# Patient Record
Sex: FEMALE | Race: WHITE | NOT HISPANIC OR LATINO | URBAN - METROPOLITAN AREA
[De-identification: names, ages, dates, MRNs, and addresses within clinical notes are randomized per-mention and may not be internally consistent; named-entity substitution may affect disease eponyms.]

---

## 2021-04-14 DIAGNOSIS — Z23 ENCOUNTER FOR IMMUNIZATION: ICD-10-CM

## 2022-01-20 ENCOUNTER — APPOINTMENT (OUTPATIENT)
Dept: LAB | Facility: HOSPITAL | Age: 77
End: 2022-01-20
Attending: ORTHOPAEDIC SURGERY
Payer: MEDICARE

## 2022-01-20 ENCOUNTER — TRANSCRIBE ORDERS (OUTPATIENT)
Dept: LAB | Facility: HOSPITAL | Age: 77
End: 2022-01-20

## 2022-01-20 ENCOUNTER — HOSPITAL ENCOUNTER (OUTPATIENT)
Dept: CARDIOLOGY | Facility: HOSPITAL | Age: 77
Discharge: HOME | End: 2022-01-20
Attending: ORTHOPAEDIC SURGERY
Payer: MEDICARE

## 2022-01-20 ENCOUNTER — OFFICE VISIT (OUTPATIENT)
Dept: PREADMISSION TESTING | Facility: HOSPITAL | Age: 77
End: 2022-01-20
Attending: ORTHOPAEDIC SURGERY
Payer: MEDICARE

## 2022-01-20 VITALS
OXYGEN SATURATION: 98 % | HEART RATE: 83 BPM | BODY MASS INDEX: 20.51 KG/M2 | RESPIRATION RATE: 18 BRPM | DIASTOLIC BLOOD PRESSURE: 58 MMHG | TEMPERATURE: 98.8 F | SYSTOLIC BLOOD PRESSURE: 140 MMHG | WEIGHT: 130.7 LBS | HEIGHT: 67 IN

## 2022-01-20 DIAGNOSIS — E11.9 TYPE 2 DIABETES MELLITUS WITHOUT COMPLICATION, WITHOUT LONG-TERM CURRENT USE OF INSULIN (CMS/HCC): ICD-10-CM

## 2022-01-20 DIAGNOSIS — Z20.822 CONTACT WITH AND (SUSPECTED) EXPOSURE TO COVID-19: ICD-10-CM

## 2022-01-20 DIAGNOSIS — M17.11 PRIMARY OSTEOARTHRITIS OF RIGHT KNEE: Primary | ICD-10-CM

## 2022-01-20 DIAGNOSIS — E78.5 HYPERLIPIDEMIA, UNSPECIFIED HYPERLIPIDEMIA TYPE: ICD-10-CM

## 2022-01-20 DIAGNOSIS — M17.11 UNILATERAL PRIMARY OSTEOARTHRITIS, RIGHT KNEE: ICD-10-CM

## 2022-01-20 DIAGNOSIS — Z20.822 CONTACT WITH AND (SUSPECTED) EXPOSURE TO COVID-19: Primary | ICD-10-CM

## 2022-01-20 DIAGNOSIS — I10 PRIMARY HYPERTENSION: ICD-10-CM

## 2022-01-20 DIAGNOSIS — Z01.818 PREOP EXAMINATION: ICD-10-CM

## 2022-01-20 LAB
ABO + RH BLD: NORMAL
ANION GAP SERPL CALC-SCNC: 11 MEQ/L (ref 3–15)
APTT PPP: 27 SEC (ref 23–35)
ATRIAL RATE: 82
BLD GP AB SCN SERPL QL: NEGATIVE
BUN SERPL-MCNC: 29 MG/DL (ref 8–20)
CALCIUM SERPL-MCNC: 9.9 MG/DL (ref 8.9–10.3)
CHLORIDE SERPL-SCNC: 104 MEQ/L (ref 98–109)
CO2 SERPL-SCNC: 26 MEQ/L (ref 22–32)
CREAT SERPL-MCNC: 0.7 MG/DL (ref 0.6–1.1)
D AG BLD QL: POSITIVE
ERYTHROCYTE [DISTWIDTH] IN BLOOD BY AUTOMATED COUNT: 12.6 % (ref 11.7–14.4)
GFR SERPL CREATININE-BSD FRML MDRD: >60 ML/MIN/1.73M*2
GLUCOSE SERPL-MCNC: 113 MG/DL (ref 70–99)
HCT VFR BLDCO AUTO: 46.6 % (ref 35–45)
HGB BLD-MCNC: 15.8 G/DL (ref 11.8–15.7)
INR PPP: 1
LABORATORY COMMENT REPORT: NORMAL
MCH RBC QN AUTO: 31.2 PG (ref 28–33.2)
MCHC RBC AUTO-ENTMCNC: 33.9 G/DL (ref 32.2–35.5)
MCV RBC AUTO: 92.1 FL (ref 83–98)
P AXIS: 64
PDW BLD AUTO: 9.2 FL (ref 9.4–12.3)
PLATELET # BLD AUTO: 190 K/UL (ref 150–369)
POTASSIUM SERPL-SCNC: 3.9 MEQ/L (ref 3.6–5.1)
PR INTERVAL: 190
PROTHROMBIN TIME: 12.8 SEC (ref 12.2–14.5)
QRS DURATION: 96
QT INTERVAL: 400
QTC CALCULATION(BAZETT): 467
R AXIS: -9
RBC # BLD AUTO: 5.06 M/UL (ref 3.93–5.22)
SODIUM SERPL-SCNC: 141 MEQ/L (ref 136–144)
SPECIMEN EXP DATE BLD: NORMAL
T WAVE AXIS: 65
VENTRICULAR RATE: 82
WBC # BLD AUTO: 4.68 K/UL (ref 3.8–10.5)

## 2022-01-20 PROCEDURE — 85730 THROMBOPLASTIN TIME PARTIAL: CPT | Mod: GA

## 2022-01-20 PROCEDURE — 85027 COMPLETE CBC AUTOMATED: CPT

## 2022-01-20 PROCEDURE — 86901 BLOOD TYPING SEROLOGIC RH(D): CPT

## 2022-01-20 PROCEDURE — 85610 PROTHROMBIN TIME: CPT | Mod: GA

## 2022-01-20 PROCEDURE — G8753 SYS BP > OR = 140: HCPCS | Performed by: HOSPITALIST

## 2022-01-20 PROCEDURE — 99214 OFFICE O/P EST MOD 30 MIN: CPT | Performed by: HOSPITALIST

## 2022-01-20 PROCEDURE — G8754 DIAS BP LESS 90: HCPCS | Performed by: HOSPITALIST

## 2022-01-20 PROCEDURE — 93005 ELECTROCARDIOGRAM TRACING: CPT

## 2022-01-20 PROCEDURE — 36415 COLL VENOUS BLD VENIPUNCTURE: CPT

## 2022-01-20 PROCEDURE — 80048 BASIC METABOLIC PNL TOTAL CA: CPT

## 2022-01-20 RX ORDER — GABAPENTIN 400 MG/1
400 CAPSULE ORAL 4 TIMES DAILY
COMMUNITY
Start: 2021-11-08

## 2022-01-20 RX ORDER — MELOXICAM 7.5 MG/1
7.5 TABLET ORAL NIGHTLY
COMMUNITY
Start: 2022-01-01 | End: 2022-02-12 | Stop reason: HOSPADM

## 2022-01-20 RX ORDER — HYDROCHLOROTHIAZIDE 25 MG/1
25 TABLET ORAL EVERY MORNING
COMMUNITY
Start: 2021-12-19

## 2022-01-20 RX ORDER — PRAVASTATIN SODIUM 40 MG/1
40 TABLET ORAL NIGHTLY
COMMUNITY
Start: 2021-11-29

## 2022-01-20 RX ORDER — LOSARTAN POTASSIUM 100 MG/1
100 TABLET ORAL EVERY MORNING
COMMUNITY
Start: 2022-01-15

## 2022-01-20 RX ORDER — METFORMIN HYDROCHLORIDE 500 MG/1
500 TABLET ORAL NIGHTLY
COMMUNITY

## 2022-01-20 ASSESSMENT — PAIN SCALES - GENERAL: PAINLEVEL: 2

## 2022-01-20 NOTE — PRE-PROCEDURE INSTRUCTIONS
1. You will be called between 3pm-7pm one business day before your procedure to tell you where and when to report. If you do not receive a phone call by 7pm, please call the Admissions office at [phone].    2. Please report to Admissions or Short Procedure Unit on the day of your procedure.   Detailed directions will be given to you when you are called with arrival time.        3. Please follow the following fasting guidelines:     No solid food EIGHT HOURS prior to surgery.  Unlimited clear liquids, meaning water or PLAIN black coffee WITHOUT any milk, cream, sugar, or sweetener are permitted up to TWO HOURS prior to arrival at the hospital.    4. Early on the morning of the procedure please take your usual dose of the listed medications with a sip of water:  No medications on the morning of surgery as per Dr Alba    5. Other Instructions: You may brush your teeth the morning of the procedure. Rinse and spit, do not swallow.  Bring a list of your medications with dosages.  Use surgical wash as directed.     6. If you develop a cold, cough, fever, rash, or other symptom prior to the day of the procedure, please report it to your physician immediately.    7. If you need to cancel the procedure for any reason, please contact your physician.    8. Make arrangements to have someone drive you home from the procedure. If you have not arranged for transportation home, your surgery may be cancelled.     9. You may not take public transportation unless accompanied by a responsible person.    10. You may not drive a car or operate complex or potentially dangerous machinery for 24 hours following anesthesia and/or sedation.    11.  If it is medically necessary for you to have a longer stay, you will be informed as soon as the decision is made.    12. Only bring essential items to the hospital.  Do not wear or bring anything of value to the hospital including jewelry of any kind, money, or wallet. Do not wear make-up or  contact lenses.  DO NOT BRING MEDICATIONS FROM HOME unless instructed to do so. DO bring your hearing aids, glasses, and a case    13. No lotion, creams, powders, or oils on skin the morning of procedure     14. Dress in comfortable clothes.    15.  If instructed, please bring a copy of your Advanced Directive (Living Will/Durable Power of ) on the day of your procedure.     16. Patients need to quarantine from the time of PAT COVID test to day of surgery, regardless of COVID vaccine status.      17. Ensuring your safety at all times is a very important part of our [facility] Culture of Safety. After having surgery and sedation, you are at risk for falling and balance issues. Although you may feel awake, the effects of the medication can last up to 24 hours after anesthesia. If you need to use the bathroom during your recovery period, nursing staff will escort you there and stay with you to ensure your safety.    18. Refrain from drinking alcohol and smoking cigarettes for 24 hours prior to surgery.    19. Shower with antibacterial soap (Dial) the night before and morning of your procedure.  If your procedure indicates the need for CHG antiseptic wash (Bactoshield or Hibiclens), please use this instead and follow instructions as discussed at the time of your Pre-Admission Testing phone interview or visit.    Above instructions reviewed with patient and patient acknowledges understanding.    Form explained by: Babs Grimes LPN

## 2022-01-20 NOTE — H&P (VIEW-ONLY)
"   Hospital Medicine Service -  Pre-Operative Consultation       Patient Name: Cecily Cuenca  Referring Surgeon: Dr. Leos    Reason for Referral: Pre-Operative Evaluation  Surgical Procedure: Right knee replacement  Operative Date: 2/10/2022  Other Providers:      PCP: Unable, To Obtain Pcp          HISTORY OF PRESENT ILLNESS      Cecily Cuenca is a 76 y.o. female presenting today to the [facility] at [facility] for pre-operative evaluation prior to planned surgery.    Patient describes a right knee pain associated with limping for \"many years\".  She has the discomfort every day, rated anywhere from a 2-10 out of 10 in severity.  Her symptoms are getting worse.  Patient has tried outpatient conservative measures include physical therapy and injections, unfortunately she remains symptomatic.  Patient to follow-up with orthopedic surgery now for planned right knee replacement on 2/10/2022    In regards to medical history:  Hypertension-controlled  Diabetes-controlled    The patient denies any current or recent chest pain or pressure, dyspnea, cough, sputum, fevers, chills, abdominal pain, nausea, vomiting, diarrhea or other symptoms.     Functionally, the patient is able to ascend a flight or so of stairs with no dyspnea or chest pain.     The patient denies, on specific questioning, the following:  No history of MI, arrhythmia,or CHF.  No history of VINNIE.  No history of DVT/PE.  No history of COPD.  No history of CVA.    No history of CKD.     PAST MEDICAL AND SURGICAL HISTORY      Past Medical History:   Diagnosis Date   • Arthritis     shoulders, knees, feet, low back, neck   • COVID-19 vaccine series completed     Pfizer x2 plus booster   • Depression     History of   • Diverticulitis of colon     past hx   • History of MRSA infection 2018    buttocks   • History of transfusion     POST OP TONSILLECTOMY AGE 8   • Hyperthyroidism    • Lipid disorder    • " "Sciatica without back pain, left     to the foot   • Skin cancer     BASAL CELL REMOVED FROM NOSE       Past Surgical History:   Procedure Laterality Date   • CHOLECYSTECTOMY     • COLONOSCOPY     • EYE SURGERY Bilateral    • HYSTERECTOMY     • TONSILLECTOMY         MEDICATIONS        Current Outpatient Medications:   •  gabapentin 400 mg capsule, Take 400 mg by mouth 4 (four) times a day.  , Disp: , Rfl:   •  hydrochlorothiazide 25 mg tablet, Take 25 mg by mouth every morning.  , Disp: , Rfl:   •  losartan 100 mg tablet, Take 100 mg by mouth every morning.  , Disp: , Rfl:   •  meloxicam 7.5 mg tablet, Take 7.5 mg by mouth nightly.  , Disp: , Rfl:   •  metFORMIN 500 mg tablet, Take 500 mg by mouth nightly.  , Disp: , Rfl:   •  pravastatin 40 mg tablet, Take 40 mg by mouth nightly.  , Disp: , Rfl:     ALLERGIES      Patient has no known allergies.    FAMILY HISTORY      family history is not on file.    Denies any prior known family history of DVTs/PEs/clotting disorder    SOCIAL HISTORY      Social History     Tobacco Use   • Smoking status: Former Smoker     Packs/day: 0.50     Years: 20.00     Pack years: 10.00     Types: Cigarettes     Quit date: 2005     Years since quittin.0   • Smokeless tobacco: Never Used   Substance Use Topics   • Alcohol use: Never   • Drug use: Never       REVIEW OF SYSTEMS      All other systems reviewed and negative except as noted in HPI    PHYSICAL EXAMINATION      Visit Vitals  BP (!) 140/58   Pulse 83   Temp 37.1 °C (98.8 °F) (Oral)   Resp 18   Ht 1.702 m (5' 7\")   Wt 59.3 kg (130 lb 11.2 oz)   SpO2 98%   BMI 20.47 kg/m²     Body mass index is 20.47 kg/m².    Physical Exam  General:    Alert, cooperative, no acute distress   Head:    Normocephalic, atraumatic   Eyes:   EOMI BL            Lungs:     Clear to auscultation bilaterally, respirations unlabored   Heart:    RRR, S1 and S2 normal, no m/r/g   Abdomen:     Soft, non-tender   Extremities:  Musculoskeletal:  Right " knee crepitus on flexion extension  Moves all limbs               Neurologic:  Behavior/Emotional:   AAOx3. Grossly nonfocal    Appropriate, cooperative       LABS / EKG        Labs  Lab Results   Component Value Date     01/20/2022    K 3.9 01/20/2022     01/20/2022    CO2 26 01/20/2022    BUN 29 (H) 01/20/2022    CREATININE 0.7 01/20/2022    WBC 4.68 01/20/2022    HGB 15.8 (H) 01/20/2022    HCT 46.6 (H) 01/20/2022     01/20/2022    INR 1.0 01/20/2022       ECG/Telemetry  I have independently reviewed the ECG. No significant findings.    ASSESSMENT AND PLAN         Osteoarthritis of right knee  Patient describes right knee limping and pain for many years, she has failed outpatient conservative measures including PT and injections  Patient to follow-up with OP surgery now for a planned right knee replacement on 2/10/2022  Additional perioperative recommendations outlined below.    Preop examination  Patient for planned right knee replacement on 2/10/2022  RCRI 0.4% chance of major cardiac event- this acceptable risk for the proposed procedure  Patient will hold her home losartan on a.m. of surgery  Additional recommendations outlined below    Primary hypertension  BP controlled  Patient will hold her home losartan on a.m. of surgery, can add back as clinically indicated postop  Noted patient takes her hydrochlorothiazide in the evenings, okay to take the night prior to surgery, can add back as clinically indicated postop    Monitor BP postop    Diabetes (CMS/[facility])  Controlled, with a recent reported hemoglobin A1c of 5.9  Patient usually takes metformin in the evenings, okay to take the night prior to surgery  Recommend point-of-care Accu-Cheks and sliding scale insulin postop  Can resume metformin on discharge with continued outpatient follow-up with her PCP.    Hyperlipidemia  Continue home meds postop       In regards to perioperative cardiac risk:  The patient denies any history of ischemic  heart disease, denies any history of CHF, denies any history of CVA, is not on pre-operative treatment with insulin, and does not have a pre-operative creatinine > 2 mg/dL.   The Revised Cardiac Risk Index (RCRI) for this patient indicates 0.4% risk.  This is acceptable risk for the proposed procedure    Further comments:  Resume supplements when OK with surgical team.  I would encourage incentive spirometry to assist with minimizing florence-operative pulmonary risk.  DVT prophylaxis and timing of such per the discretion of the surgeon.     Please do not hesitate to contact [facility] during the upcoming hospitalization with any questions or concerns.    Additional Recommendations:     Delirium  Given her age, I would attempt to minimize any narcotics. She theoretically is at risk for post-operative delirium. Should this develop, I would encourage conservative measures, supportive care, re-orientation tactics, and attempt to avoid pharmacologic utilization. Additionally, please mobilize when safe from a surgical standpoint. Prevent dehydration and avoid constipation. Maintain oxygenation. Address sensory deprivation. Avoid jose catheters and restraints as possible and re-orient as appropriate. Maintain adequate pain control. Avoid unnecessary sedating drugs. Minimize sleep disruptions and maintain sleep wake cycle.          Loc Alba MD  1/20/2022

## 2022-01-20 NOTE — ASSESSMENT & PLAN NOTE
Controlled, with a recent reported hemoglobin A1c of 5.9  Patient usually takes metformin in the evenings, okay to take the night prior to surgery  Recommend point-of-care Accu-Cheks and sliding scale insulin postop  Can resume metformin on discharge with continued outpatient follow-up with her PCP.

## 2022-01-20 NOTE — ASSESSMENT & PLAN NOTE
Patient describes right knee limping and pain for many years, she has failed outpatient conservative measures including PT and injections  Patient to follow-up with OP surgery now for a planned right knee replacement on 2/10/2022  Additional perioperative recommendations outlined below.

## 2022-01-20 NOTE — ASSESSMENT & PLAN NOTE
Patient for planned right knee replacement on 2/10/2022  RCRI 0.4% chance of major cardiac event- this acceptable risk for the proposed procedure  Patient will hold her home losartan on a.m. of surgery  Additional recommendations outlined below

## 2022-01-20 NOTE — ASSESSMENT & PLAN NOTE
BP controlled  Patient will hold her home losartan on a.m. of surgery, can add back as clinically indicated postop  Noted patient takes her hydrochlorothiazide in the evenings, okay to take the night prior to surgery, can add back as clinically indicated postop    Monitor BP postop

## 2022-02-07 ENCOUNTER — APPOINTMENT (OUTPATIENT)
Dept: LAB | Facility: HOSPITAL | Age: 77
End: 2022-02-07
Attending: ORTHOPAEDIC SURGERY
Payer: MEDICARE

## 2022-02-07 DIAGNOSIS — M17.11 UNILATERAL PRIMARY OSTEOARTHRITIS, RIGHT KNEE: ICD-10-CM

## 2022-02-07 DIAGNOSIS — Z20.822 CONTACT WITH AND (SUSPECTED) EXPOSURE TO COVID-19: ICD-10-CM

## 2022-02-07 LAB — SARS-COV-2 RNA RESP QL NAA+PROBE: NEGATIVE

## 2022-02-07 PROCEDURE — C9803 HOPD COVID-19 SPEC COLLECT: HCPCS

## 2022-02-07 PROCEDURE — U0003 INFECTIOUS AGENT DETECTION BY NUCLEIC ACID (DNA OR RNA); SEVERE ACUTE RESPIRATORY SYNDROME CORONAVIRUS 2 (SARS-COV-2) (CORONAVIRUS DISEASE [COVID-19]), AMPLIFIED PROBE TECHNIQUE, MAKING USE OF HIGH THROUGHPUT TECHNOLOGIES AS DESCRIBED BY CMS-2020-01-R: HCPCS

## 2022-02-08 ENCOUNTER — ANESTHESIA EVENT (OUTPATIENT)
Dept: OPERATING ROOM | Facility: HOSPITAL | Age: 77
End: 2022-02-08
Payer: MEDICARE

## 2022-02-10 ENCOUNTER — HOSPITAL ENCOUNTER (OUTPATIENT)
Facility: HOSPITAL | Age: 77
Discharge: HOME | End: 2022-02-12
Attending: ORTHOPAEDIC SURGERY | Admitting: ORTHOPAEDIC SURGERY
Payer: MEDICARE

## 2022-02-10 ENCOUNTER — ANESTHESIA (OUTPATIENT)
Dept: OPERATING ROOM | Facility: HOSPITAL | Age: 77
End: 2022-02-10
Payer: MEDICARE

## 2022-02-10 ENCOUNTER — APPOINTMENT (OUTPATIENT)
Dept: RADIOLOGY | Facility: HOSPITAL | Age: 77
End: 2022-02-10
Attending: NURSE PRACTITIONER
Payer: MEDICARE

## 2022-02-10 DIAGNOSIS — M17.11 PRIMARY OSTEOARTHRITIS OF RIGHT KNEE: Primary | ICD-10-CM

## 2022-02-10 LAB
ABO + RH BLD: NORMAL
BLD GP AB SCN SERPL QL: NEGATIVE
D AG BLD QL: POSITIVE
GLUCOSE BLD-MCNC: 119 MG/DL (ref 70–99)
GLUCOSE BLD-MCNC: 122 MG/DL (ref 70–99)
GLUCOSE BLD-MCNC: 169 MG/DL (ref 70–99)
GLUCOSE BLD-MCNC: 205 MG/DL (ref 70–99)
GLUCOSE BLD-MCNC: 271 MG/DL (ref 70–99)
LABORATORY COMMENT REPORT: NORMAL
POCT TEST: ABNORMAL
SPECIMEN EXP DATE BLD: NORMAL

## 2022-02-10 PROCEDURE — 63600000 HC DRUGS/DETAIL CODE: Performed by: NURSE PRACTITIONER

## 2022-02-10 PROCEDURE — 27200000 HC STERILE SUPPLY: Performed by: ORTHOPAEDIC SURGERY

## 2022-02-10 PROCEDURE — 97162 PT EVAL MOD COMPLEX 30 MIN: CPT | Mod: GP

## 2022-02-10 PROCEDURE — 71000001 HC PACU PHASE 1 INITIAL 30MIN: Performed by: ORTHOPAEDIC SURGERY

## 2022-02-10 PROCEDURE — 25800000 HC PHARMACY IV SOLUTIONS: Performed by: NURSE PRACTITIONER

## 2022-02-10 PROCEDURE — 86901 BLOOD TYPING SEROLOGIC RH(D): CPT

## 2022-02-10 PROCEDURE — 25000000 HC PHARMACY GENERAL: Performed by: NURSE ANESTHETIST, CERTIFIED REGISTERED

## 2022-02-10 PROCEDURE — 0SRC0JA REPLACEMENT OF RIGHT KNEE JOINT WITH SYNTHETIC SUBSTITUTE, UNCEMENTED, OPEN APPROACH: ICD-10-PCS | Performed by: ORTHOPAEDIC SURGERY

## 2022-02-10 PROCEDURE — 73560 X-RAY EXAM OF KNEE 1 OR 2: CPT | Mod: RT

## 2022-02-10 PROCEDURE — 63700000 HC SELF-ADMINISTRABLE DRUG: Performed by: NURSE PRACTITIONER

## 2022-02-10 PROCEDURE — 63600000 HC DRUGS/DETAIL CODE: Performed by: NURSE ANESTHETIST, CERTIFIED REGISTERED

## 2022-02-10 PROCEDURE — 63700000 HC SELF-ADMINISTRABLE DRUG: Performed by: PHYSICIAN ASSISTANT

## 2022-02-10 PROCEDURE — 99226 PR SBSQ OBSERVATION CARE/DAY 35 MINUTES: CPT | Performed by: HOSPITALIST

## 2022-02-10 PROCEDURE — 36415 COLL VENOUS BLD VENIPUNCTURE: CPT | Performed by: ORTHOPAEDIC SURGERY

## 2022-02-10 PROCEDURE — 36000016 HC OR LEVEL 6 EA ADDL MIN: Performed by: ORTHOPAEDIC SURGERY

## 2022-02-10 PROCEDURE — 25000000 HC PHARMACY GENERAL: Performed by: STUDENT IN AN ORGANIZED HEALTH CARE EDUCATION/TRAINING PROGRAM

## 2022-02-10 PROCEDURE — 63600000 HC DRUGS/DETAIL CODE: Performed by: STUDENT IN AN ORGANIZED HEALTH CARE EDUCATION/TRAINING PROGRAM

## 2022-02-10 PROCEDURE — 36000006 HC OR LEVEL 6 INITIAL 30MIN: Performed by: ORTHOPAEDIC SURGERY

## 2022-02-10 PROCEDURE — 37000002 HC ANESTHESIA MAC: Performed by: ORTHOPAEDIC SURGERY

## 2022-02-10 PROCEDURE — C1776 JOINT DEVICE (IMPLANTABLE): HCPCS | Performed by: ORTHOPAEDIC SURGERY

## 2022-02-10 PROCEDURE — 25000000 HC PHARMACY GENERAL: Performed by: NURSE PRACTITIONER

## 2022-02-10 PROCEDURE — 71000011 HC PACU PHASE 1 EA ADDL MIN: Performed by: ORTHOPAEDIC SURGERY

## 2022-02-10 PROCEDURE — 63600000 HC DRUGS/DETAIL CODE: Mod: JW | Performed by: NURSE ANESTHETIST, CERTIFIED REGISTERED

## 2022-02-10 PROCEDURE — 63600000 HC DRUGS/DETAIL CODE: Performed by: PHYSICIAN ASSISTANT

## 2022-02-10 DEVICE — TIBIAL COMPONENT
Type: IMPLANTABLE DEVICE | Site: KNEE | Status: FUNCTIONAL
Brand: TRIATHLON

## 2022-02-10 DEVICE — CRUCIATE RETAINING FEMORAL
Type: IMPLANTABLE DEVICE | Site: KNEE | Status: FUNCTIONAL
Brand: TRIATHLON

## 2022-02-10 DEVICE — PATELLA
Type: IMPLANTABLE DEVICE | Site: KNEE | Status: FUNCTIONAL
Brand: TRIATHLON

## 2022-02-10 DEVICE — TIBIAL BEARING INSERT - CS
Type: IMPLANTABLE DEVICE | Site: KNEE | Status: FUNCTIONAL
Brand: TRIATHLON

## 2022-02-10 RX ORDER — POLYETHYLENE GLYCOL 3350 17 G/17G
17 POWDER, FOR SOLUTION ORAL DAILY PRN
Status: DISCONTINUED | OUTPATIENT
Start: 2022-02-10 | End: 2022-02-11

## 2022-02-10 RX ORDER — GABAPENTIN 400 MG/1
400 CAPSULE ORAL 3 TIMES DAILY
Status: DISCONTINUED | OUTPATIENT
Start: 2022-02-10 | End: 2022-02-12 | Stop reason: HOSPADM

## 2022-02-10 RX ORDER — ROPIVACAINE HYDROCHLORIDE 5 MG/ML
INJECTION, SOLUTION EPIDURAL; INFILTRATION; PERINEURAL
Status: COMPLETED
Start: 2022-02-10 | End: 2022-02-10

## 2022-02-10 RX ORDER — BUPIVACAINE HYDROCHLORIDE 7.5 MG/ML
INJECTION, SOLUTION INTRASPINAL
Status: COMPLETED | OUTPATIENT
Start: 2022-02-10 | End: 2022-02-10

## 2022-02-10 RX ORDER — KETOROLAC TROMETHAMINE 15 MG/ML
15 INJECTION, SOLUTION INTRAMUSCULAR; INTRAVENOUS
Status: COMPLETED | OUTPATIENT
Start: 2022-02-10 | End: 2022-02-12

## 2022-02-10 RX ORDER — DEXTROSE 50 % IN WATER (D50W) INTRAVENOUS SYRINGE
25 AS NEEDED
Status: DISCONTINUED | OUTPATIENT
Start: 2022-02-10 | End: 2022-02-12 | Stop reason: HOSPADM

## 2022-02-10 RX ORDER — CELECOXIB 200 MG/1
200 CAPSULE ORAL ONCE
Status: COMPLETED | OUTPATIENT
Start: 2022-02-10 | End: 2022-02-10

## 2022-02-10 RX ORDER — TRANEXAMIC ACID 10 MG/ML
1000 INJECTION, SOLUTION INTRAVENOUS ONCE
Status: COMPLETED | OUTPATIENT
Start: 2022-02-10 | End: 2022-02-10

## 2022-02-10 RX ORDER — MORPHINE SULFATE 2 MG/ML
1 INJECTION, SOLUTION INTRAMUSCULAR; INTRAVENOUS EVERY 4 HOURS PRN
Status: DISPENSED | OUTPATIENT
Start: 2022-02-10 | End: 2022-02-11

## 2022-02-10 RX ORDER — ONDANSETRON HYDROCHLORIDE 2 MG/ML
4 INJECTION, SOLUTION INTRAVENOUS EVERY 6 HOURS PRN
Status: DISCONTINUED | OUTPATIENT
Start: 2022-02-10 | End: 2022-02-12 | Stop reason: HOSPADM

## 2022-02-10 RX ORDER — MIDAZOLAM HYDROCHLORIDE 2 MG/2ML
INJECTION, SOLUTION INTRAMUSCULAR; INTRAVENOUS AS NEEDED
Status: DISCONTINUED | OUTPATIENT
Start: 2022-02-10 | End: 2022-02-10 | Stop reason: SURG

## 2022-02-10 RX ORDER — TIZANIDINE 2 MG/1
2 TABLET ORAL EVERY 6 HOURS PRN
Status: DISCONTINUED | OUTPATIENT
Start: 2022-02-10 | End: 2022-02-12 | Stop reason: HOSPADM

## 2022-02-10 RX ORDER — ALUMINUM HYDROXIDE, MAGNESIUM HYDROXIDE, AND SIMETHICONE 1200; 120; 1200 MG/30ML; MG/30ML; MG/30ML
30 SUSPENSION ORAL EVERY 4 HOURS PRN
Status: DISCONTINUED | OUTPATIENT
Start: 2022-02-10 | End: 2022-02-12 | Stop reason: HOSPADM

## 2022-02-10 RX ORDER — ASPIRIN 81 MG/1
81 TABLET ORAL 2 TIMES DAILY
Status: DISCONTINUED | OUTPATIENT
Start: 2022-02-10 | End: 2022-02-12 | Stop reason: HOSPADM

## 2022-02-10 RX ORDER — OXYCODONE HYDROCHLORIDE 5 MG/1
5-10 TABLET ORAL EVERY 4 HOURS PRN
Status: DISCONTINUED | OUTPATIENT
Start: 2022-02-10 | End: 2022-02-12 | Stop reason: HOSPADM

## 2022-02-10 RX ORDER — FENTANYL CITRATE 50 UG/ML
25 INJECTION, SOLUTION INTRAMUSCULAR; INTRAVENOUS
Status: DISCONTINUED | OUTPATIENT
Start: 2022-02-10 | End: 2022-02-10 | Stop reason: HOSPADM

## 2022-02-10 RX ORDER — DOCUSATE SODIUM 100 MG/1
100 CAPSULE, LIQUID FILLED ORAL 2 TIMES DAILY
Status: DISCONTINUED | OUTPATIENT
Start: 2022-02-10 | End: 2022-02-12 | Stop reason: HOSPADM

## 2022-02-10 RX ORDER — OXYCODONE HYDROCHLORIDE 5 MG/1
5 TABLET ORAL ONCE AS NEEDED
Status: DISCONTINUED | OUTPATIENT
Start: 2022-02-10 | End: 2022-02-10 | Stop reason: HOSPADM

## 2022-02-10 RX ORDER — ACETAMINOPHEN 325 MG/1
975 TABLET ORAL ONCE
Status: COMPLETED | OUTPATIENT
Start: 2022-02-10 | End: 2022-02-10

## 2022-02-10 RX ORDER — IBUPROFEN 200 MG
16-32 TABLET ORAL AS NEEDED
Status: DISCONTINUED | OUTPATIENT
Start: 2022-02-10 | End: 2022-02-12 | Stop reason: HOSPADM

## 2022-02-10 RX ORDER — SODIUM CHLORIDE 9 MG/ML
125 INJECTION, SOLUTION INTRAVENOUS CONTINUOUS
Status: DISPENSED | OUTPATIENT
Start: 2022-02-10 | End: 2022-02-11

## 2022-02-10 RX ORDER — HYDRALAZINE HYDROCHLORIDE 20 MG/ML
5 INJECTION INTRAMUSCULAR; INTRAVENOUS
Status: DISCONTINUED | OUTPATIENT
Start: 2022-02-10 | End: 2022-02-10 | Stop reason: HOSPADM

## 2022-02-10 RX ORDER — FENTANYL CITRATE 50 UG/ML
INJECTION, SOLUTION INTRAMUSCULAR; INTRAVENOUS AS NEEDED
Status: DISCONTINUED | OUTPATIENT
Start: 2022-02-10 | End: 2022-02-10 | Stop reason: SURG

## 2022-02-10 RX ORDER — ACETAMINOPHEN 325 MG/1
650 TABLET ORAL
Status: COMPLETED | OUTPATIENT
Start: 2022-02-10 | End: 2022-02-12

## 2022-02-10 RX ORDER — PANTOPRAZOLE SODIUM 40 MG/1
40 TABLET, DELAYED RELEASE ORAL DAILY
Status: DISCONTINUED | OUTPATIENT
Start: 2022-02-10 | End: 2022-02-12 | Stop reason: HOSPADM

## 2022-02-10 RX ORDER — SODIUM CHLORIDE, SODIUM LACTATE, POTASSIUM CHLORIDE, CALCIUM CHLORIDE 600; 310; 30; 20 MG/100ML; MG/100ML; MG/100ML; MG/100ML
INJECTION, SOLUTION INTRAVENOUS CONTINUOUS
Status: DISCONTINUED | OUTPATIENT
Start: 2022-02-10 | End: 2022-02-10

## 2022-02-10 RX ORDER — ONDANSETRON HYDROCHLORIDE 2 MG/ML
4 INJECTION, SOLUTION INTRAVENOUS
Status: DISCONTINUED | OUTPATIENT
Start: 2022-02-10 | End: 2022-02-10 | Stop reason: HOSPADM

## 2022-02-10 RX ORDER — DEXAMETHASONE SODIUM PHOSPHATE 4 MG/ML
10 INJECTION, SOLUTION INTRA-ARTICULAR; INTRALESIONAL; INTRAMUSCULAR; INTRAVENOUS; SOFT TISSUE ONCE
Status: COMPLETED | OUTPATIENT
Start: 2022-02-11 | End: 2022-02-11

## 2022-02-10 RX ORDER — DEXAMETHASONE SODIUM PHOSPHATE 4 MG/ML
INJECTION, SOLUTION INTRA-ARTICULAR; INTRALESIONAL; INTRAMUSCULAR; INTRAVENOUS; SOFT TISSUE AS NEEDED
Status: DISCONTINUED | OUTPATIENT
Start: 2022-02-10 | End: 2022-02-10 | Stop reason: SURG

## 2022-02-10 RX ORDER — DIPHENHYDRAMINE HCL 50 MG/ML
12.5 VIAL (ML) INJECTION EVERY 6 HOURS PRN
Status: DISCONTINUED | OUTPATIENT
Start: 2022-02-10 | End: 2022-02-12 | Stop reason: HOSPADM

## 2022-02-10 RX ORDER — CEFAZOLIN SODIUM 1 G/50ML
1 SOLUTION INTRAVENOUS
Status: COMPLETED | OUTPATIENT
Start: 2022-02-10 | End: 2022-02-11

## 2022-02-10 RX ORDER — EPHEDRINE SULFATE 50 MG/ML
INJECTION, SOLUTION INTRAVENOUS AS NEEDED
Status: DISCONTINUED | OUTPATIENT
Start: 2022-02-10 | End: 2022-02-10 | Stop reason: SURG

## 2022-02-10 RX ORDER — PROPOFOL 10 MG/ML
INJECTION, EMULSION INTRAVENOUS CONTINUOUS PRN
Status: DISCONTINUED | OUTPATIENT
Start: 2022-02-10 | End: 2022-02-10 | Stop reason: SURG

## 2022-02-10 RX ORDER — ROPIVACAINE HYDROCHLORIDE 5 MG/ML
INJECTION, SOLUTION EPIDURAL; INFILTRATION; PERINEURAL
Status: DISCONTINUED | OUTPATIENT
Start: 2022-02-10 | End: 2022-02-10 | Stop reason: SURG

## 2022-02-10 RX ORDER — CEFAZOLIN SODIUM/WATER 2 G/20 ML
2 SYRINGE (ML) INTRAVENOUS
Status: COMPLETED | OUTPATIENT
Start: 2022-02-10 | End: 2022-02-10

## 2022-02-10 RX ORDER — PHENYLEPHRINE HYDROCHLORIDE 10 MG/ML
INJECTION INTRAVENOUS AS NEEDED
Status: DISCONTINUED | OUTPATIENT
Start: 2022-02-10 | End: 2022-02-10 | Stop reason: SURG

## 2022-02-10 RX ORDER — PRAVASTATIN SODIUM 20 MG/1
40 TABLET ORAL NIGHTLY
Status: DISCONTINUED | OUTPATIENT
Start: 2022-02-10 | End: 2022-02-12 | Stop reason: HOSPADM

## 2022-02-10 RX ORDER — INSULIN ASPART 100 [IU]/ML
0-12 INJECTION, SOLUTION INTRAVENOUS; SUBCUTANEOUS
Status: DISCONTINUED | OUTPATIENT
Start: 2022-02-10 | End: 2022-02-12 | Stop reason: HOSPADM

## 2022-02-10 RX ORDER — DEXTROSE 40 %
15-30 GEL (GRAM) ORAL AS NEEDED
Status: DISCONTINUED | OUTPATIENT
Start: 2022-02-10 | End: 2022-02-12 | Stop reason: HOSPADM

## 2022-02-10 RX ORDER — BISACODYL 10 MG/1
10 SUPPOSITORY RECTAL DAILY PRN
Status: DISCONTINUED | OUTPATIENT
Start: 2022-02-10 | End: 2022-02-12 | Stop reason: HOSPADM

## 2022-02-10 RX ADMIN — TRANEXAMIC ACID 1000 MG: 10 INJECTION, SOLUTION INTRAVENOUS at 08:57

## 2022-02-10 RX ADMIN — INSULIN ASPART 2 UNITS: 100 INJECTION, SOLUTION INTRAVENOUS; SUBCUTANEOUS at 21:09

## 2022-02-10 RX ADMIN — CELECOXIB 200 MG: 200 CAPSULE ORAL at 06:10

## 2022-02-10 RX ADMIN — OXYCODONE HYDROCHLORIDE 5 MG: 5 TABLET ORAL at 21:08

## 2022-02-10 RX ADMIN — SODIUM CHLORIDE 125 ML/HR: 9 INJECTION, SOLUTION INTRAVENOUS at 17:17

## 2022-02-10 RX ADMIN — OXYCODONE HYDROCHLORIDE 5 MG: 5 TABLET ORAL at 13:59

## 2022-02-10 RX ADMIN — MIDAZOLAM HYDROCHLORIDE 2 MG: 1 INJECTION, SOLUTION INTRAMUSCULAR; INTRAVENOUS at 07:08

## 2022-02-10 RX ADMIN — KETOROLAC TROMETHAMINE 15 MG: 15 INJECTION, SOLUTION INTRAMUSCULAR; INTRAVENOUS at 14:00

## 2022-02-10 RX ADMIN — EPHEDRINE SULFATE 10 MG: 50 INJECTION, SOLUTION INTRAVENOUS at 07:20

## 2022-02-10 RX ADMIN — KETOROLAC TROMETHAMINE 15 MG: 15 INJECTION, SOLUTION INTRAMUSCULAR; INTRAVENOUS at 21:08

## 2022-02-10 RX ADMIN — FENTANYL CITRATE 50 MCG: 50 INJECTION, SOLUTION INTRAMUSCULAR; INTRAVENOUS at 07:08

## 2022-02-10 RX ADMIN — VANCOMYCIN HYDROCHLORIDE 1 G: 1 INJECTION, POWDER, LYOPHILIZED, FOR SOLUTION INTRAVENOUS at 06:46

## 2022-02-10 RX ADMIN — INSULIN ASPART 6 UNITS: 100 INJECTION, SOLUTION INTRAVENOUS; SUBCUTANEOUS at 17:18

## 2022-02-10 RX ADMIN — PHENYLEPHRINE HYDROCHLORIDE 50 MCG: 10 INJECTION INTRAVENOUS at 08:29

## 2022-02-10 RX ADMIN — ACETAMINOPHEN 650 MG: 325 TABLET ORAL at 21:07

## 2022-02-10 RX ADMIN — ACETAMINOPHEN 650 MG: 325 TABLET ORAL at 13:59

## 2022-02-10 RX ADMIN — SODIUM CHLORIDE, POTASSIUM CHLORIDE, SODIUM LACTATE AND CALCIUM CHLORIDE: 600; 310; 30; 20 INJECTION, SOLUTION INTRAVENOUS at 06:46

## 2022-02-10 RX ADMIN — PRAVASTATIN SODIUM 40 MG: 20 TABLET ORAL at 21:08

## 2022-02-10 RX ADMIN — PHENYLEPHRINE HYDROCHLORIDE 50 MCG: 10 INJECTION INTRAVENOUS at 08:05

## 2022-02-10 RX ADMIN — PHENYLEPHRINE HYDROCHLORIDE 100 MCG: 10 INJECTION INTRAVENOUS at 07:55

## 2022-02-10 RX ADMIN — GABAPENTIN 400 MG: 400 CAPSULE ORAL at 21:08

## 2022-02-10 RX ADMIN — CEFAZOLIN SODIUM 1 G: 1 SOLUTION INTRAVENOUS at 15:57

## 2022-02-10 RX ADMIN — MIDAZOLAM HYDROCHLORIDE 1 MG: 1 INJECTION, SOLUTION INTRAMUSCULAR; INTRAVENOUS at 08:16

## 2022-02-10 RX ADMIN — ASPIRIN 81 MG: 81 TABLET, COATED ORAL at 21:09

## 2022-02-10 RX ADMIN — ROPIVACAINE HYDROCHLORIDE 20 ML: 5 INJECTION, SOLUTION EPIDURAL; INFILTRATION; PERINEURAL at 10:20

## 2022-02-10 RX ADMIN — DOCUSATE SODIUM 100 MG: 100 CAPSULE, LIQUID FILLED ORAL at 14:00

## 2022-02-10 RX ADMIN — BUPIVACAINE HYDROCHLORIDE IN DEXTROSE 1.6 ML: 7.5 INJECTION, SOLUTION SUBARACHNOID at 07:12

## 2022-02-10 RX ADMIN — GABAPENTIN 400 MG: 400 CAPSULE ORAL at 13:59

## 2022-02-10 RX ADMIN — DOCUSATE SODIUM 100 MG: 100 CAPSULE, LIQUID FILLED ORAL at 21:08

## 2022-02-10 RX ADMIN — PANTOPRAZOLE SODIUM 40 MG: 40 TABLET, DELAYED RELEASE ORAL at 14:00

## 2022-02-10 RX ADMIN — CEFAZOLIN 2 G: 330 INJECTION, POWDER, FOR SOLUTION INTRAMUSCULAR; INTRAVENOUS at 07:34

## 2022-02-10 RX ADMIN — PROPOFOL INJECTABLE EMULSION 50 MCG/KG/MIN: 10 INJECTION, EMULSION INTRAVENOUS at 07:25

## 2022-02-10 RX ADMIN — EPHEDRINE SULFATE 10 MG: 50 INJECTION, SOLUTION INTRAVENOUS at 08:45

## 2022-02-10 RX ADMIN — TRANEXAMIC ACID 1000 MG: 10 INJECTION, SOLUTION INTRAVENOUS at 06:46

## 2022-02-10 RX ADMIN — ACETAMINOPHEN 975 MG: 325 TABLET ORAL at 06:10

## 2022-02-10 RX ADMIN — DEXAMETHASONE SODIUM PHOSPHATE 4 MG: 4 INJECTION, SOLUTION INTRA-ARTICULAR; INTRALESIONAL; INTRAMUSCULAR; INTRAVENOUS; SOFT TISSUE at 07:40

## 2022-02-10 ASSESSMENT — COGNITIVE AND FUNCTIONAL STATUS - GENERAL
CLIMB 3 TO 5 STEPS WITH RAILING: 3 - A LITTLE
STANDING UP FROM CHAIR USING ARMS: 3 - A LITTLE
WALKING IN HOSPITAL ROOM: 3 - A LITTLE
MOVING TO AND FROM BED TO CHAIR: 3 - A LITTLE

## 2022-02-10 ASSESSMENT — ENCOUNTER SYMPTOMS: DEPRESSION: 1

## 2022-02-10 ASSESSMENT — PATIENT HEALTH QUESTIONNAIRE - PHQ9: SUM OF ALL RESPONSES TO PHQ9 QUESTIONS 1 & 2: 0

## 2022-02-10 NOTE — PLAN OF CARE
Problem: Adult Inpatient Plan of Care  Goal: Plan of Care Review  Outcome: Progressing  Flowsheets (Taken 2/10/2022 1511)  Progress: improving  Plan of Care Reviewed With: patient  Outcome Summary: PT eval complete

## 2022-02-10 NOTE — ASSESSMENT & PLAN NOTE
-Status post right knee total arthroplasty on 2/10 by Dr. Leos  -Defer DVT prophylaxis to orthopedic team with aspirin twice a day  -Continue pain control combination of Tylenol, oxycodone and morphine  -Add MiraLAX to prevent immobility/opioid-induced constipation  -PT/OT evaluation and treatment

## 2022-02-10 NOTE — ANESTHESIA PROCEDURE NOTES
Spinal Block    Patient location during procedure: OR  Start time: 2/10/2022 7:12 AM  Staffing  Performed: anesthesiologist   Anesthesiologist: Lesley Del Cid MD  Reason for block: at surgeon's request  Preanesthetic Checklist  Completed: patient identified, surgical consent, pre-op evaluation, timeout performed, IV checked, risks and benefits discussed, monitors and equipment checked and sterile field maintained during procedure  Spinal Block  Patient position: sitting  Prep: ChloraPrep and site prepped and draped  Patient monitoring: heart rate, cardiac monitor, continuous pulse ox and blood pressure  Approach: midline  Location: L3-4  Injection technique: single-shot  Needle  Needle type: Sprotte   Needle gauge: 22 G  Needle length: 3.5 in  Assessment  Events: cerebrospinal fluid  Additional Notes  Procedure well tolerated. Vital signs stable.    Medications Administered - 2/10/2022 7:12 AM   Bupivacaine PF (MARCAINE SPINAL) 0.75% intrathecal injection, 1.6 mL

## 2022-02-10 NOTE — ANESTHESIOLOGIST PRE-PROCEDURE ATTESTATION
Pre-Procedure Patient Identification:  I am the Primary Anesthesiologist and have identified the patient on 02/10/22 at 6:54 AM.   I have confirmed the procedure(s) will be performed by the following surgeon/proceduralist Edward Leos MD.

## 2022-02-10 NOTE — PLAN OF CARE
Problem: Adult Inpatient Plan of Care  Goal: Readiness for Transition of Care  Intervention: Mutually Develop Transition Plan  Flowsheets (Taken 2/10/2022 7628)  Anticipated Discharge Disposition: home with home health  Equipment Needed After Discharge: walker, front-wheeled  Discharge Coordination/Progress: See CM note  Assistive Device/Animal Currently Used at Home: (has dressing kit)   cane, straight   shower chair   commode, 3-in-1   other (see comments)  Transportation Concerns: car, none  Readmission Within the Last 30 Days: no previous admission in last 30 days  Patient/Family Anticipated Services at Transition: home health care  Patient/Family Anticipates Transition to: home with help/services  Transportation Anticipated: family or friend will provide  Concerns to be Addressed: adjustment to diagnosis/illness  Patient's Choice of Community Agency(s):   declined list   wants to use [facility] for HC PT  Offered/Gave Vendor List: yes  Type of Home Care Services: home PT    Case Management Note: Met with patient in the room, introduced self, verified ID, demographic info, and PCP.  PCP:  Dr. Miller  Pharmacy: [facility]    Housing: Patient lives alone in a 2 story home with 4 steps to enter: can have first floor living if needed.  Her sons and daughter in law will be able to stay with her at d/c.      PLOF:  Patient is independent with ADL's.      DME: uses none; has cane, shower chair, dressing kit, commode  Will need to obtain walker from PT.    Anticipated Disposition:  Anticipate discharge to home with HC PT.   A list of providers that are participating in the Medicare program and are located in the desired geographic area was offered to the patient. Patient is aware that the list and ratings are available from the Medicare.gov website.  Patient declined list and wants to use [facility] for HC PT. Referral to [facility] liaison via phone call.     Will continue to follow.    Plan of care discussed with  patient; she will have a ride home.

## 2022-02-10 NOTE — PROGRESS NOTES
Home Care - Received home care referral for PT from Rupal Garcia CM. Record reviewed and met with patient. Discussed home care services - she is agreeable to PT. Gave patient written info./phone # for [facility]. Patient confirms she has a 3 in 1 commode, cane, and shower chair; PT will issue a rolling walker for home. Nurse, Nancy, made aware of arrangements. Will follow for discharge. Alondra Luna RN, .

## 2022-02-10 NOTE — OR SURGEON
Pre-Procedure patient identification:  I am the primary operating surgeon/proceduralist and I have identified the patient on 02/10/22 at 7:08 AM Edward Leos MD  Phone Number: [phone]

## 2022-02-10 NOTE — DISCHARGE INSTRUCTIONS
[facility]   Duane Mane M.D. SIERRA Head M.D.   eMhran Lentz M.D. Mehran Orona M.D.   Delio Parry M.D. Lopez M.D.   Elvis Murphy III, M.D. David T. Yucha, M.D. Craig G. Kriza, D.P.M., PAULIE. Mehran Morrow M.D.   Post-Operative Instructions for Total Knee Replacement   We commend you on taking the first steps towards living an active independent lifestyle. We are here for support and to cheer you on but YOU play an important role in your rehabilitative road to recovery. We have developed the following guidelines to assist you as you navigate through this process.   Pain Control   • You were given prescriptions on discharge from the hospital, please get these filled as soon as possible. Some of your pain medication may be taken on a regular basis such as Gabapentin, Celebrex or Meloxicam. Other medications such as narcotics, should be taken as needed. Taking your medication before bedtime will help with sleep. Do not drink alcohol or drive while taking narcotic medication. If your pain is not relieved, or worsens, call us at the number listed below.     Stool Softener (Usually Colace)   • This helps to avoid constipation caused by the other medications. Take this 2 times per day for 2-4 weeks, or as long as you are taking narcotics. *If you are not constipated or you experience diarrhea, stop taking Colace!     Swelling   • Apply an ice pack/ice bag to your knee for twenty minutes every hour as needed throughout the day. Planning on at least three times daily. Always keep a layer of fabric between you and the ice. Keep you knee elevated as much as possible. If you were issued a cryotherapy unit, please follow instruction provided at discharge. The unit should remain between 39-40 degrees and remember to keep your skin protected.     Preventing Blood Clots:   • If you are not allergic to aspirin and you are not already taking blood thinners you  will be instructed to take one 81mg enteric coated Aspirin twice a day for four to six weeks post op. If you were prescribed a different blood thinner follow those instructions.   • The following symptoms may indicate the formation of a blood clot. If you notice any of these symptoms please call the office immediately:   o Calf is painful and feels warm to the touch.   o Persistent swelling, of the foot, ankle, or calf that does not go away with elevation of the leg.   o Chest pain or shortness of breath.     • [facility] / [address], [facility] / [facility] / [place] [address] / Fax [phone]   • [facility] / [address] / [address] / [place] [address] / Fax [phone]   • [facility] / [address] [place] [address], [address] / [place] [address] / Fax [phone]   • [address] [place] [address] / [address] / [place] [address] / Fax [phone]   • [facility] / [address] [facility] / [place] [address] / Fax [phone]   • [phone] or [phone]     Preventing Infection   • Preventing infection is extremely important for the rest of your life. Your new knee is artificial and does not have your body's natural protection against infection. Bacteria from a variety of sources can enter your bloodstream and invade the area surrounding your new knee. This can eventually cause it to become loose and painful. A list of possible sources of infection follows, along with things you can do to minimize the risk to your new knee.   o Dental work: Cleaning, drilling, extraction, or root canal. Take antibiotics as prescribed prior to dental work for the first two years post op, after two years   o Signs of infection such as urinary tract, ear, or throat should be treated by your primary physician and followed appropriately.   o Any invasive procedure, where there is high risk of infection: Inform your doctor that you have an artificial joint and need to be given  antibiotics to protect it during these tests.     Wound Care   • As you leave the hospital, you will have a dressing over your surgical incision. Keep the dressing clean and dry. You can expect a small amount of blood on the dressing. You will see one of the three options below depending on your surgeon's preference.     o Staple Closure: Remove your dressing at 2 days 5 days after surgery. After you remove the dressing, you will see staples; these will remain for 10-14 days and will be removed by the visiting nurse or at your follow up visit. After you have removed the dressing, you may shower but remember no baths or swimming. (You don't want to soak the area until you have seen the doctor). After showering, pat the area dry and leave open to air.     o Prineo Closure: You are able to shower with this dressing on the 2nd day after surgery. This dressing must stay intact and should not be removed. This dressing will be removed by your surgeon at your initial post op visit around 10-14 days post operatively. You may shower but remember no baths or swimming. (You don't want to soak the area until you have seen the doctor). After showering, pat the area dry and leave open to air.     o Surgical Glue Closure: Remove the bandage on the 2nd day after surgery. You may shower on the second day after surgery. Do not pick at the incision where the surgical glue is visible. The surgical glue will wear off over several weeks' time. After you have removed the dressing, you may shower but remember no baths or swimming. (You don't want to soak the area until you have seen the doctor). After showering, pat the area dry and leave open to air.     Activity   • Use your walker and bear weight on your leg as tolerated. Soon after discharge you will begin outpatient therapy via home care or at an outpatient facility where you will progress to a cane under their guidance.   • Do not sit for longer than 30-45 minutes at a time. Use  "chairs with arms. You may nap if you are tired, but DO NOT stay in bed all day. Frequent short walks, either indoors or outdoors, are key to a successful recovery.   • You will experience discomfort in your operated knee and may have difficulty sleeping at night. This is part of the recovery process. It is important that you do your exercises even though your knee hurts when you move it. 20 minutes of icing after exercising is often helpful in decreasing the discomfort. The key to a successful recovery is movement - both exercise and walking.   • Sleep without a pillow under your knee. Keeping you knee in one position all night will undo all your hard work during the day. If you have been discharged with a leg support, it may be used at night or through the day intermittently as needed.   • You should do stairs with support. Do one step at a time - \"good\" knee up - \"bad\" knee down. Use a railing if available.   • You must have advanced to a cane, be off narcotics, and feel comfortable and safe before driving. You should consult with your physician at your first post op visit before driving, drive only if you feel safe.     Average recovery time   • 2-4 Weeks - Acute pain resolves; you may experience soreness, stiffness, swelling throughout the rehab period but it will begin to diminish.   • 3 Months - Most activities of daily living are performed comfortably. Soreness, stiffness, swelling begin to resolve. Kneeling on your knee is okay.   • Up to 2 Years - Full recovery - On the road to optimum recovery!     When to call you doctor   • Please contact your orthopaedic surgeon's office if you notice any swelling, redness, large amount of drainage from the surgical site, develop a fever greater than 101?F, or excessive pain not relieved by your pain medication. Please contact us at [phone] or [phone].   "

## 2022-02-10 NOTE — ANESTHESIA PROCEDURE NOTES
Peripheral Block    Patient location during procedure: post-op  Start time: 2/10/2022 10:20 AM  Reason for block: procedure for pain, at surgeon's request and post-op pain management  Staffing  Performed: anesthesiologist   Anesthesiologist: Lesley Del Cid MD  Preanesthetic Checklist  Completed: patient identified, surgical consent, pre-op evaluation, timeout performed, IV checked, risks and benefits discussed, monitors and equipment checked and sterile field maintained during procedure  Peripheral Block  Patient position: supine  Prep: ChloraPrep and site prepped and draped  Patient monitoring: heart rate, cardiac monitor, continuous pulse ox and blood pressure  Block type: adductor canal  Laterality: right  Injection technique: single-shot      Guidance: nerve stimulator and ultrasound guided  Image captured and stored.        Needle  Needle gauge: 22 G  Needle length: 3 1/8 in  Needle localization: anatomical landmarks and ultrasound guidance  Test dose: negative  Assessment  Injection assessment: negative aspiration for heme, incremental injection, local visualized surrounding nerve on ultrasound and no paresthesia on injection  Paresthesia pain: none  Heart rate change: no  Slow fractionated injection: yes  Medications Administered - 2/10/2022 10:20 AM   Ropivacaine (PF) (NAROPIN) 5 mg/mL (0.5 %) injection, 20 mL

## 2022-02-10 NOTE — PROGRESS NOTES
Ortho Note    S:  denies pain; denies SOB/CP; denies Nausea    O:  pt seen and examined in PACU;    VSS; no acute distress   A&O  Right knee dressing CDI   +DP/PT pulses; feet warm, pink, brisk cap refill  Compartments soft/nontender and compressible  sensation and motor decreased secondary to spinal anesthesia  B/L LE SCDs in place      A/P POD 0 s/p right TKA  -post op Xray in PACU   -pain management  -DVT ppx: SCDS, early ambulation, ASA 81mg BID x 4 weeks   -PT/OT OOB WBAT  -Monitor I&Os   -Neurovascular checks   -IV abx  -medical management per [facility]      -dispo: 4W     Addendum: 1216  Ortho Motor Check  SILT B/L LE  +DF, PF, and EHL B/L  +DP/PT pulses

## 2022-02-10 NOTE — OP NOTE
REPORT TYPE: Operative Note    DATE OF OPERATION: 02/10/2022    SURGEON:  Edward Leos MD.    PREOPERATIVE DIAGNOSIS:  Right knee osteoarthrosis.    POSTOPERATIVE DIAGNOSIS:  Right knee osteoarthrosis.    PROCEDURE PERFORMED:  Right total knee arthroplasty.    ANESTHESIA:  Spinal.    INDICATIONS:  The patient is a 76-year-old woman with end-stage severe arthrosis, presents for surgery.    DESCRIPTION OF PROCEDURE:  The patient correctly identified, right knee initialed.  Anesthesia performed spinal anesthetic.  The patient was transferred to the operating room and placed supine on the operating room table.  IV antibiotics were given.    Tranexamic acid infused.  A tourniquet placed on the right thigh, but not inflated.  The right lower extremity prepped and draped in standard surgical fashion.  Limb was elevated, exsanguinated with an Esmarch, tourniquet inflated to 250 mmHg.  Attention   was turned to the right knee where a midline incision was made from about an inch above the patella to the tibial tubercle.  The incision was carried down to skin and subcutaneous tissue.  The knee was entered using a paramedian arthrotomy.  The soft   tissues were elevated off the proximal medial tibia.  Osteophytes were removed and the tibial cutting guide was placed externally such that its cutting surface was perpendicular to the tibia long axis, removing 2 mm of bone from the affected medial side.    The knee was flexed.  The cut made, preserving the PCL insertion and attention was then turned to the femur.  A drill was used to enter femoral canal just above the PCL insertion.  Alignment wesly inserted in the femur, guide set at 5 degree valgus   angulation was inserted and rotated such that its cutting surface was parallel to the epicondylar axis.  This was pinned in place and using a provisional anterior cut guide, a provisional anterior cut was made and the distal cutting block was then pinned   in place and 10 mm of bone  removed from the distal femur at a 5-degree valgus angulation.  Cut surface sized to a #3.  A size 3, 4-in-1 cutting block was pinned in place and the AP and chamfer cuts made.  The knee placed in full extension.  Any   remaining meniscus was excised.  The lateral genicular artery cauterized.  Gaps assessed at 13 mm and symmetric.  Patella was then measured at 22 mm and 10 mm of cartilage and bone were resected parallel with the tendon insertions, incising the cut   surface to a 32, the 32 guide centered and 3 peg holes drilled and the trial inserted.  The knee flexed.  The posterior compartments were inspected.  Any remaining debris was removed.  The tibia was delivered anteriorly, irrigated, dried, and sized to a   #3.  The 3 guide was placed anatomically and an alignment wesly was used to confirm alignment.  Satisfied with the positioning, the punch and drill guide were used to complete preparation of the tibia and the size 3 Deo Triathlon Tritanium baseplate   impacted the tibia without cement with excellent fixation.  A 13 mm trial polyethylene component was inserted followed by the trial right cruciate retaining femoral component, which was centered on the femur.  The knee was placed through a range of   motion.  Full flexion, extension were obtained including anatomic patellar tracking.  Lug holes were drilled.  The trial components were removed.  The bone plug was inserted in the femur and the knee was thoroughly irrigated and dried.  The X3   polyethylene component, size 3 x 13 mm was inserted into the tibia tray and locked into position.  The size 3 cruciate retaining right femoral component was impacted on the end of the femur without cement with excellent fixation.  Knee was placed in full   extension and 32 mm asymmetric patellar component was compressed in the patella and the knee was thoroughly irrigated, including a dilute iodine solution.  The arthrotomy was then repaired in flexion with #1 Vicryl  and a running #2 Quill.  The skin   closed with 2-0 Monocryl, 3-0 Stratafix, and Dermabond.  A sterile dressing applied.  The tourniquet released for a total tourniquet time of 39 minutes.  The patient transferred to the recovery room in stable condition.      Edward Leos MD    DD: 02/10/2022 08:59  DT: 02/10/2022 09:01  Voice ID: [id number]/Report ID: [id number]  am

## 2022-02-10 NOTE — HOSPITAL COURSE
Cecily is a 76 y.o. female admitted on 2/10/2022 with Osteoarthritis of right knee, unspecified osteoarthritis type [M17.11]  Osteoarthritis [M19.90]. Principal problem is Osteoarthritis of right knee.    Past Medical History  Cecily has a past medical history of Arthritis, COVID-19 vaccine series completed, Depression, Diverticulitis of colon, History of MRSA infection (2018), History of transfusion, Lipid disorder, Sciatica without back pain, left, and Skin cancer.    History of Present Illness   S/p  R TKA

## 2022-02-10 NOTE — ADDENDUM NOTE
Addendum  created 02/10/22 1204 by Lesley Del Cid MD    Clinical Note Signed, Diagnosis association updated, Intraprocedure Blocks edited

## 2022-02-10 NOTE — PROGRESS NOTES
Patient: Cecily Cuenca  Location:  [place] [address]  MRN:  [id number]  Today's date:  2/10/2022    Session ended c patient sitting in recliner, call bell in reach, tray table aside, immediate needs met, questions answered, and NSG aware of pt position and performance.    Cecily is a 76 y.o. female admitted on 2/10/2022 with Osteoarthritis of right knee, unspecified osteoarthritis type [M17.11]  Osteoarthritis [M19.90]. Principal problem is Osteoarthritis of right knee.    Past Medical History  Cecily has a past medical history of Arthritis, COVID-19 vaccine series completed, Depression, Diverticulitis of colon, History of MRSA infection (2018), History of transfusion, Lipid disorder, Sciatica without back pain, left, and Skin cancer.    History of Present Illness   S/p  R TKA      PT Vitals    Date/Time Pulse SpO2 BP BP Location BP Method Pt Position Observations [facility]   02/10/22 1433 85 98 % 119/58 Left upper arm Automatic Sitting -- ATK   02/10/22 1445 -- -- 125/58 Left upper arm Automatic Sitting At end of session ATK      PT Pain    Date/Time Pain Type Location Rating: Rest Rating: Activity Interventions [facility]   02/10/22 1433 Pain Assessment knee 5 5 position adjusted ATK          Prior Living Environment      Most Recent Value   Living Environment Comment Pt lives alone in a house with 1+2+1 JOSE MARTIN. Has Full bath and bedroom on first floor with a tub. Pt has FF of stairs to her B&B with walk in shower.        Prior Level of Function      Most Recent Value   Ambulation independent   Transferring independent   Toileting independent   Bathing independent   Dressing independent   Eating independent   Assistive Device Currently Used at Home cane, straight, shower chair, commode, 3-in-1, grab bar  [Has these items but did not need to use them]           PT Evaluation and Treatment - 02/10/22 1433        PT Time Calculation    Start Time 1433     Stop Time 1450     Time Calculation (min) 17 min        Session  Details    Document Type initial evaluation     Mode of Treatment physical therapy        General Information    Patient Profile Reviewed yes     Patient/Family/Caregiver Comments/Observations RN cleared for therapy     General Observations of Patient Pt received in recliner     Existing Precautions/Restrictions weight bearing        Weight-bearing Status    Right LE Weight-Bearing Status weight-bearing as tolerated (WBAT)        Cognition/Psychosocial    Comment, Cognition AAOx4        Sensory Assessment (Somatosensory)    Sensory Assessment (Somatosensory) LE sensation intact        Range of Motion (ROM)    Right Lower Extremity (ROM) knee     Knee, Right (ROM) 5-78        Strength Comprehensive (MMT)    Comprehensive MMT Assessment lower extremity strength deficit     Comment R LE not formally assessed s/p R TKA. L LE WFL        Bed Mobility    Comment (Bed Mobility) OOB at start and end of session        Sit to Stand Transfer    Edgar, Sit to Stand Transfer supervision;verbal cues     Verbal Cues hand placement;safety;technique     Assistive Device walker, front-wheeled     Comment S for safety. Good control        Stand to Sit Transfer    Edgar, Stand to Sit Transfer supervision;verbal cues     Verbal Cues hand placement;safety;technique     Assistive Device walker, front-wheeled     Comment Relatively controlled descent. Cues for sequencing        Gait Training    Edgar, Gait supervision     Assistive Device walker, front-wheeled     Distance in Feet 54 feet     Pattern (Gait) step-through     Deviations/Abnormal Patterns (Gait) dhaval decreased;gait speed decreased;step length decreased     Comment (Gait/Stairs) Initially step to pattern used however able to progress to step through. No LOB.        Stairs Training    Comment Will assess in therapy gym        Safety Issues, Functional Mobility    Impairments Affecting Function (Mobility) balance;pain;range of motion (ROM);strength         Balance    Balance Assessment sit to stand dynamic balance     Static Sitting Balance WFL     Dynamic Sitting Balance WFL     Sit to Stand Dynamic Balance mild impairment     Static Standing Balance mild impairment     Dynamic Standing Balance mild impairment     Comment, Balance RW        AM-PAC (TM) - Mobility (Current Function)    Turning from your back to your side while in a flat bed without using bedrails? 3 - A Little     Moving from lying on your back to sitting on the side of a flat bed without using bedrails? 3 - A Little     Moving to and from a bed to a chair? 3 - A Little     Standing up from a chair using your arms? 3 - A Little     To walk in a hospital room? 3 - A Little     Climbing 3-5 steps with a railing? 3 - A Little     AM-PAC (TM) Mobility Score 18        Assessment/Plan (PT)    Daily Outcome Statement Pt seen for PT eval s/p R TKA. She currently is able to mobilize at a supervision level with use of RW. She is limited by pain, weakness, and dec'd balance. Anticipate pt will be able to safely d/c home POD1 after completing therapy gym.     Rehab Potential good, to achieve stated therapy goals     Therapy Frequency 5-7 times/wk     Planned Therapy Interventions balance training;bed mobility training;gait training;home exercise program;strengthening;stair training;transfer training;patient/family education               PT Assessment/Plan      Most Recent Value   PT Recommended Discharge Disposition home at 02/10/2022 1433   Anticipated Equipment Needs at Discharge (PT) walker, front-wheeled at 02/10/2022 1433   Patient/Family Therapy Goals Statement to go home at 02/10/2022 1433                    Education Documentation  Activity, taught by Víctor Estes PT at 2/10/2022  3:11 PM.  Learner: Patient  Readiness: Acceptance  Method: Explanation  Response: Verbalizes Understanding  Comment: Role of PT and PT POC. Importance of safety and mobility. WBAT R LE          PT Goals      Most Recent Value    Transfer Goal 1    Activity/Assistive Device sit-to-stand/stand-to-sit, bed-to-chair/chair-to-bed, car transfer, walker, front-wheeled at 02/10/2022 1433   Vance modified independence at 02/10/2022 1433   Time Frame by discharge at 02/10/2022 1433   Progress/Outcome goal ongoing at 02/10/2022 1433   Gait Training Goal 1    Activity/Assistive Device gait (walking locomotion), walker, front-wheeled at 02/10/2022 1433   Vance modified independence at 02/10/2022 1433   Distance 200 at 02/10/2022 1433   Time Frame by discharge at 02/10/2022 1433   Progress/Outcome goal ongoing at 02/10/2022 1433   Stairs Goal 1    Activity/Assistive Device stairs, all skills, using handrail, left, using handrail, right at 02/10/2022 1433   Vance modified independence at 02/10/2022 1433   Number of Stairs 8 at 02/10/2022 1433   Time Frame by discharge at 02/10/2022 1433   Progress/Outcome goal ongoing at 02/10/2022 1433

## 2022-02-10 NOTE — ANESTHESIA POSTPROCEDURE EVALUATION
Patient: Cecily Cuenca    Procedure Summary     Date: 02/10/22 Room / Location:  OR 3 / [facility] OR    Anesthesia Start: 0721 Anesthesia Stop: 0851    Procedure: right total knee arthtoplasty (Right ) Diagnosis:       Osteoarthritis of right knee, unspecified osteoarthritis type      (oa rt knee)    Surgeons: Edward Leos MD Responsible Provider: Lesley Del Cid MD    Anesthesia Type: spinal, MAC ASA Status: 3          Anesthesia Type: spinal, MAC  PACU Vitals  2/10/2022 0848 - 2/10/2022 0948      2/10/2022  0850 2/10/2022  0900 2/10/2022  0910 2/10/2022  0915    BP: 102/46 105/49 111/55 124/63    Temp: -- 36.3 °C (97.3 °F) -- --    Pulse: 79 78 77 78    Resp: 40 8 8 13    SpO2: 96 % 98 % 100 % 100 %              2/10/2022  0920 2/10/2022  0930          BP: -- 111/60      Temp: -- --      Pulse: 78 76      Resp: 14 8      SpO2: 100 % 100 %              Anesthesia Post Evaluation    Pain management: adequate  Patient location during evaluation: PACU  Patient participation: complete - patient participated  Level of consciousness: awake and alert  Cardiovascular status: acceptable  Airway Patency: adequate  Respiratory status: acceptable and room air  Hydration status: acceptable  Anesthetic complications: no

## 2022-02-10 NOTE — ASSESSMENT & PLAN NOTE
-Hold losartan hydrochlorothiazide for now due to low blood pressure reading  -Once blood pressure rebounds, resume antihypertensives  -Advised that she checks blood pressure at home prior to resuming both losartan and hydrochlorothiazide

## 2022-02-10 NOTE — ANESTHESIA PREPROCEDURE EVALUATION
Anesthesia ROS/MED HX    Anesthesia History   No history of anesthetic complications  Neuro/Psych    Depression  Cardiovascular   dyslipidemia   hypertension   Covid19 Test Reviewed and ECG reviewed  Musculoskeletal   Arthritis  Endo/Other   Diabetes       Past Medical History:   Diagnosis Date   • Arthritis     shoulders, knees, feet, low back, neck   • COVID-19 vaccine series completed     Pfizer x2 plus booster   • Depression     History of   • Diverticulitis of colon     past hx   • History of MRSA infection 2018    buttocks   • History of transfusion     POST OP TONSILLECTOMY AGE 8   • Lipid disorder    • Sciatica without back pain, left     to the foot   • Skin cancer     BASAL CELL REMOVED FROM NOSE       Current Facility-Administered Medications   Medication Dose Route Frequency   • ceFAZolin  2 g intravenous 60 min Pre-Op   • lactated ringer's   intravenous Continuous   • povidone-iodine  1 application Each Nostril 60 min Pre-Op   • tranexamic acid  1,000 mg intravenous Once   • vancomycin  1 g intravenous 90 min Pre-Op       Prior to Admission medications    Medication Sig Start Date End Date Taking? Authorizing Provider   gabapentin 400 mg capsule Take 400 mg by mouth 4 (four) times a day.   11/8/21  Yes Mason Rebollar MD   hydrochlorothiazide 25 mg tablet Take 25 mg by mouth every morning.   12/19/21  Yes Mason Rebollar MD   losartan 100 mg tablet Take 100 mg by mouth every morning.   1/15/22  Yes Mason Rebollar MD   meloxicam 7.5 mg tablet Take 7.5 mg by mouth nightly.   1/1/22  Yes Mason Rebollar MD   metFORMIN 500 mg tablet Take 500 mg by mouth nightly.     Yes Mason Rebollar MD   pravastatin 40 mg tablet Take 40 mg by mouth nightly.   11/29/21  Yes Mason Rebollar MD       CBC Results       01/20/22     1015    WBC 4.68    RBC 5.06    HGB 15.8    HCT 46.6    MCV 92.1    MCH 31.2    MCHC 33.9              BMP Results       01/20/22     1015         K 3.9    Cl 104    CO2 26    Glucose 113    BUN 29    Creatinine 0.7    Calcium 9.9    Anion Gap 11    EGFR >60.0          Past Surgical History:   Procedure Laterality Date   • CHOLECYSTECTOMY     • COLONOSCOPY     • EYE SURGERY Bilateral    • HYSTERECTOMY     • TONSILLECTOMY         Physical Exam    Airway   Mallampati: II   TM distance: >3 FB   Neck ROM: full  Cardiovascular - normal   Rhythm: regular   Rate: normalPulmonary - normal   clear to auscultation  Dental - normal        Anesthesia Plan    Plan: spinal and MAC    Technique: spinal and MAC     Lines and Monitors: PIV     Airway: natural airway / supplemental oxygen   ASA 3  Anesthetic plan and risks discussed with: patient  Induction:    intravenous   Postop Plan:   Patient Disposition: inpatient floor planned admission   Pain Management: IV analgesics, spinal and femoral block

## 2022-02-10 NOTE — PROGRESS NOTES
Hospital Medicine Service -  Daily Progress Note       SUBJECTIVE   Interval History: Patient seen in PACU bed 6.  Evaluated in [facility].  [facility] is consulted this admission for postop medical management.    At time of exam patient resting in bed in NAD.  No postop pain at this time.  Denies chest pain, SOB, nausea, abdominal pain, headache, vision changes.      VSS.     OBJECTIVE      Vital signs in last 24 hours:  Temp:  [36.3 °C (97.3 °F)-36.7 °C (98 °F)] 36.3 °C (97.3 °F)  Heart Rate:  [76-86] 76  Resp:  [8-40] 8  BP: (102-148)/(46-69) 111/60    Intake/Output Summary (Last 24 hours) at 2/10/2022 1056  Last data filed at 2/10/2022 0741  Gross per 24 hour   Intake 500 ml   Output --   Net 500 ml       PHYSICAL EXAMINATION      Physical Exam  Constitutional:       General: She is not in acute distress.     Appearance: She is not ill-appearing, toxic-appearing or diaphoretic.   HENT:      Head: Normocephalic and atraumatic.   Eyes:      General: No scleral icterus.     Extraocular Movements: Extraocular movements intact.      Conjunctiva/sclera: Conjunctivae normal.      Pupils: Pupils are equal, round, and reactive to light.   Cardiovascular:      Rate and Rhythm: Normal rate and regular rhythm.      Heart sounds: Normal heart sounds.   Pulmonary:      Effort: Pulmonary effort is normal. No respiratory distress.      Breath sounds: Normal breath sounds. No stridor. No wheezing, rhonchi or rales.   Abdominal:      General: Bowel sounds are normal. There is no distension.      Palpations: Abdomen is soft.      Tenderness: There is no abdominal tenderness. There is no guarding.   Musculoskeletal:         General: No swelling. Normal range of motion.      Cervical back: Normal range of motion.   Skin:     General: Skin is warm and dry.      Capillary Refill: Capillary refill takes less than 2 seconds.      Comments: Right knee postop dressing CDI   Neurological:      Mental Status: She is alert and oriented to person,  place, and time.   Psychiatric:         Mood and Affect: Mood normal.         Behavior: Behavior normal.            LINES, CATHETERS, DRAINS, AIRWAYS, AND WOUNDS   Lines, Drains, and Airways:  Wounds (agree with documentation and present on admission):  Peripheral IV (Adult) 02/10/22 Posterior;Right Hand (Active)   Number of days: 0       Surgical Incision Knee Right (Active)   Number of days: 0         Comments:      LABS / IMAGING / TELE      Labs  Labs reviewed . See A/P for plan regarding pertinent labs.     SARS-CoV-2 (COVID-19) (no units)   Date/Time Value   02/07/2022 1159 Negative       Imaging  I have independently reviewed the pertinent imaging from the last 24 hrs. and Significant findings include:     XR RIGHT KNEE - pending    ECG/Telemetry  I have independently reviewed the ECG. No significant findings.  PACU EKG NSR    ASSESSMENT AND PLAN      * Osteoarthritis of right knee  Assessment & Plan  - POD 0 right TKA  - Further management including pain control and VTE prophylaxis per primary service    Hyperlipidemia  Assessment & Plan  - Chronic  - Resume statin    Diabetes (CMS/HCC)  Assessment & Plan  - Chronic condition  - Hold Metformin in immediate postop period  - AccuChek + SSI + diabetic diet    Primary hypertension  Assessment & Plan  - Chronic, stable  - BP currently low-normal  - Resume Hctz and Losartan as postop BP requires and renal function allows       VTE Assessment: Padua    VTE Prophylaxis:  Current anticoagulants:    •None      Code Status: Full Code      Estimated Discharge Date: 2/11/2022     Disposition Planning: per primary service     GLORIA Burk  2/10/2022

## 2022-02-11 LAB
ANION GAP SERPL CALC-SCNC: 9 MEQ/L (ref 3–15)
BUN SERPL-MCNC: 18 MG/DL (ref 8–20)
CALCIUM SERPL-MCNC: 8.5 MG/DL (ref 8.9–10.3)
CHLORIDE SERPL-SCNC: 106 MEQ/L (ref 98–109)
CO2 SERPL-SCNC: 24 MEQ/L (ref 22–32)
CREAT SERPL-MCNC: 0.5 MG/DL (ref 0.6–1.1)
ERYTHROCYTE [DISTWIDTH] IN BLOOD BY AUTOMATED COUNT: 12.5 % (ref 11.7–14.4)
GFR SERPL CREATININE-BSD FRML MDRD: >60 ML/MIN/1.73M*2
GLUCOSE BLD-MCNC: 153 MG/DL (ref 70–99)
GLUCOSE BLD-MCNC: 159 MG/DL (ref 70–99)
GLUCOSE BLD-MCNC: 180 MG/DL (ref 70–99)
GLUCOSE BLD-MCNC: 186 MG/DL (ref 70–99)
GLUCOSE SERPL-MCNC: 140 MG/DL (ref 70–99)
HCT VFR BLDCO AUTO: 36.2 % (ref 35–45)
HGB BLD-MCNC: 12 G/DL (ref 11.8–15.7)
MAGNESIUM SERPL-MCNC: 2.1 MG/DL (ref 1.8–2.5)
MCH RBC QN AUTO: 31.2 PG (ref 28–33.2)
MCHC RBC AUTO-ENTMCNC: 33.1 G/DL (ref 32.2–35.5)
MCV RBC AUTO: 94 FL (ref 83–98)
PDW BLD AUTO: 9.4 FL (ref 9.4–12.3)
PLATELET # BLD AUTO: 150 K/UL (ref 150–369)
POCT TEST: ABNORMAL
POTASSIUM SERPL-SCNC: 3.8 MEQ/L (ref 3.6–5.1)
RBC # BLD AUTO: 3.85 M/UL (ref 3.93–5.22)
SODIUM SERPL-SCNC: 139 MEQ/L (ref 136–144)
WBC # BLD AUTO: 7.62 K/UL (ref 3.8–10.5)

## 2022-02-11 PROCEDURE — 63700000 HC SELF-ADMINISTRABLE DRUG: Performed by: HOSPITALIST

## 2022-02-11 PROCEDURE — 63700000 HC SELF-ADMINISTRABLE DRUG: Performed by: NURSE PRACTITIONER

## 2022-02-11 PROCEDURE — 63600000 HC DRUGS/DETAIL CODE: Performed by: NURSE PRACTITIONER

## 2022-02-11 PROCEDURE — 97530 THERAPEUTIC ACTIVITIES: CPT | Mod: GP,CQ

## 2022-02-11 PROCEDURE — 83735 ASSAY OF MAGNESIUM: CPT | Performed by: HOSPITALIST

## 2022-02-11 PROCEDURE — 97166 OT EVAL MOD COMPLEX 45 MIN: CPT | Mod: GO

## 2022-02-11 PROCEDURE — 36415 COLL VENOUS BLD VENIPUNCTURE: CPT | Performed by: NURSE PRACTITIONER

## 2022-02-11 PROCEDURE — 85027 COMPLETE CBC AUTOMATED: CPT | Performed by: NURSE PRACTITIONER

## 2022-02-11 PROCEDURE — 99225 PR SBSQ OBSERVATION CARE/DAY 25 MINUTES: CPT | Performed by: HOSPITALIST

## 2022-02-11 PROCEDURE — 80048 BASIC METABOLIC PNL TOTAL CA: CPT | Performed by: NURSE PRACTITIONER

## 2022-02-11 PROCEDURE — 97116 GAIT TRAINING THERAPY: CPT | Mod: GP,CQ

## 2022-02-11 PROCEDURE — 63700000 HC SELF-ADMINISTRABLE DRUG: Performed by: PHYSICIAN ASSISTANT

## 2022-02-11 RX ORDER — METFORMIN HYDROCHLORIDE 500 MG/1
500 TABLET ORAL NIGHTLY
Status: DISCONTINUED | OUTPATIENT
Start: 2022-02-11 | End: 2022-02-12 | Stop reason: HOSPADM

## 2022-02-11 RX ORDER — POLYETHYLENE GLYCOL 3350 17 G/17G
17 POWDER, FOR SOLUTION ORAL 2 TIMES DAILY
Status: DISCONTINUED | OUTPATIENT
Start: 2022-02-11 | End: 2022-02-12 | Stop reason: HOSPADM

## 2022-02-11 RX ADMIN — DOCUSATE SODIUM 100 MG: 100 CAPSULE, LIQUID FILLED ORAL at 20:12

## 2022-02-11 RX ADMIN — INSULIN ASPART 2 UNITS: 100 INJECTION, SOLUTION INTRAVENOUS; SUBCUTANEOUS at 21:32

## 2022-02-11 RX ADMIN — GABAPENTIN 400 MG: 400 CAPSULE ORAL at 14:00

## 2022-02-11 RX ADMIN — ACETAMINOPHEN 650 MG: 325 TABLET ORAL at 14:00

## 2022-02-11 RX ADMIN — ASPIRIN 81 MG: 81 TABLET, COATED ORAL at 08:22

## 2022-02-11 RX ADMIN — KETOROLAC TROMETHAMINE 15 MG: 15 INJECTION, SOLUTION INTRAMUSCULAR; INTRAVENOUS at 02:52

## 2022-02-11 RX ADMIN — ONDANSETRON 4 MG: 2 INJECTION INTRAMUSCULAR; INTRAVENOUS at 08:15

## 2022-02-11 RX ADMIN — OXYCODONE HYDROCHLORIDE 10 MG: 5 TABLET ORAL at 12:11

## 2022-02-11 RX ADMIN — OXYCODONE HYDROCHLORIDE 10 MG: 5 TABLET ORAL at 02:51

## 2022-02-11 RX ADMIN — KETOROLAC TROMETHAMINE 15 MG: 15 INJECTION, SOLUTION INTRAMUSCULAR; INTRAVENOUS at 08:22

## 2022-02-11 RX ADMIN — ACETAMINOPHEN 650 MG: 325 TABLET ORAL at 02:52

## 2022-02-11 RX ADMIN — PRAVASTATIN SODIUM 40 MG: 20 TABLET ORAL at 20:12

## 2022-02-11 RX ADMIN — DEXAMETHASONE SODIUM PHOSPHATE 10 MG: 4 INJECTION, SOLUTION INTRA-ARTICULAR; INTRALESIONAL; INTRAMUSCULAR; INTRAVENOUS; SOFT TISSUE at 08:21

## 2022-02-11 RX ADMIN — DOCUSATE SODIUM 100 MG: 100 CAPSULE, LIQUID FILLED ORAL at 08:22

## 2022-02-11 RX ADMIN — ACETAMINOPHEN 650 MG: 325 TABLET ORAL at 20:12

## 2022-02-11 RX ADMIN — OXYCODONE HYDROCHLORIDE 5 MG: 5 TABLET ORAL at 21:54

## 2022-02-11 RX ADMIN — KETOROLAC TROMETHAMINE 15 MG: 15 INJECTION, SOLUTION INTRAMUSCULAR; INTRAVENOUS at 14:00

## 2022-02-11 RX ADMIN — GABAPENTIN 400 MG: 400 CAPSULE ORAL at 08:22

## 2022-02-11 RX ADMIN — ASPIRIN 81 MG: 81 TABLET, COATED ORAL at 20:15

## 2022-02-11 RX ADMIN — ACETAMINOPHEN 650 MG: 325 TABLET ORAL at 08:21

## 2022-02-11 RX ADMIN — POLYETHYLENE GLYCOL 3350 17 G: 17 POWDER, FOR SOLUTION ORAL at 12:12

## 2022-02-11 RX ADMIN — CEFAZOLIN SODIUM 1 G: 1 SOLUTION INTRAVENOUS at 00:25

## 2022-02-11 RX ADMIN — ONDANSETRON 4 MG: 2 INJECTION INTRAMUSCULAR; INTRAVENOUS at 21:55

## 2022-02-11 RX ADMIN — PANTOPRAZOLE SODIUM 40 MG: 40 TABLET, DELAYED RELEASE ORAL at 08:22

## 2022-02-11 RX ADMIN — METFORMIN HYDROCHLORIDE 500 MG: 500 TABLET ORAL at 21:31

## 2022-02-11 RX ADMIN — KETOROLAC TROMETHAMINE 15 MG: 15 INJECTION, SOLUTION INTRAMUSCULAR; INTRAVENOUS at 20:13

## 2022-02-11 RX ADMIN — INSULIN ASPART 4 UNITS: 100 INJECTION, SOLUTION INTRAVENOUS; SUBCUTANEOUS at 17:00

## 2022-02-11 RX ADMIN — INSULIN ASPART 2 UNITS: 100 INJECTION, SOLUTION INTRAVENOUS; SUBCUTANEOUS at 12:12

## 2022-02-11 RX ADMIN — GABAPENTIN 400 MG: 400 CAPSULE ORAL at 20:12

## 2022-02-11 ASSESSMENT — COGNITIVE AND FUNCTIONAL STATUS - GENERAL
WALKING IN HOSPITAL ROOM: 3 - A LITTLE
HELP NEEDED FOR PERSONAL GROOMING: 4 - NONE
TOILETING: 4 - NONE
DRESSING REGULAR UPPER BODY CLOTHING: 4 - NONE
STANDING UP FROM CHAIR USING ARMS: 3 - A LITTLE
CLIMB 3 TO 5 STEPS WITH RAILING: 3 - A LITTLE
AFFECT: WFL;ANXIOUS
HELP NEEDED FOR BATHING: 3 - A LITTLE
MOVING TO AND FROM BED TO CHAIR: 3 - A LITTLE
EATING MEALS: 4 - NONE
AFFECT: WFL;ANXIOUS
DRESSING REGULAR LOWER BODY CLOTHING: 4 - NONE

## 2022-02-11 NOTE — PROGRESS NOTES
Ortho Note    S:   Pain controlled; denies SOB/CP; denies N/V     O:   pt seen and examined bedside; resting comfortably   VSS; no acute distress, A&O,   RLE dressing CDI   +DP/PT pulses; foot warm, pink, brisk cap refill   Right knee with mild swelling; Compartments soft/nontender and compressible  SILT R DF/PF/EHL  HGB 12 Drop in HgB due to acute blood loss anemia- expected from surgery-pt asymptomatic    A/P   POD 1 s/p R TKA  -pain control  -DVT ppx: SCDS, early ambulation, ASA 81mg BID x 4 weeks   -PT/OT OOB WBAT  -Monitor I&Os   -Neurovascular checks   -medical management per [facility]      Dispo: D/C home pending clearances by [facility] and PT/OT

## 2022-02-11 NOTE — PLAN OF CARE
Problem: Adult Inpatient Plan of Care  Goal: Plan of Care Review  Outcome: Progressing  Flowsheets  Taken 2/11/2022 1423 by Amanda Morin  Outcome Summary: OT eval completed. See full report for details.  Taken 2/10/2022 1511 by Víctor Estes, PT  Plan of Care Reviewed With: patient     Problem: Self-Care Deficit  Goal: Improved Ability to Complete Activities of Daily Living  Outcome: Progressing

## 2022-02-11 NOTE — PATIENT CARE CONFERENCE
Care Progression Rounds Note  Date: 2/11/2022  Time: 10:27 AM     Patient Name: Cecily Cuenca     Medical Record Number: [id number]   YOB: 1945  Sex: Female      Room/Bed: 4607    Admitting Diagnosis: Osteoarthritis of right knee, unspecified osteoarthritis type [M17.11]  Osteoarthritis [M19.90]   Admit Date/Time: 2/10/2022  5:39 AM    Primary Diagnosis: Osteoarthritis of right knee  Principal Problem: Osteoarthritis of right knee    GMLOS: pending  Anticipated Discharge Date: 2/11/2022    AM-PAC:  Mobility Score: 18    Discharge Planning:  Current Living Arrangements: home  Concerns to be Addressed: adjustment to diagnosis/illness  Anticipated Discharge Disposition: home with home health  Type of Home Care Services: home PT    Barriers to Discharge:  Therapy update pending    Comments:       Participants:  ,nursing,occupational therapy,social work/services,physical therapy

## 2022-02-11 NOTE — PROGRESS NOTES
Patient:  Cecily Cuenca  Location:  [place] [address]  MRN:  [id number]  Today's date:  2/11/2022    Pt left in recliner chair at therapy gym and transported back to room by therapy aid.    Cecily is a 76 y.o. female admitted on 2/10/2022 with Osteoarthritis of right knee, unspecified osteoarthritis type [M17.11]  Osteoarthritis [M19.90]. Principal problem is Osteoarthritis of right knee.    Past Medical History  Cecily has a past medical history of Arthritis, COVID-19 vaccine series completed, Depression, Diverticulitis of colon, History of MRSA infection (2018), History of transfusion, Lipid disorder, Sciatica without back pain, left, and Skin cancer.    History of Present Illness   S/p  R TKA      OT Vitals    Date/Time Pulse HR Source SpO2 Pt Activity O2 Therapy BP BP Location BP Method Pt Position [facility]   02/11/22 1315 79 Monitor 95 % At rest None (Room air) 109/56 Right upper arm Automatic Sitting LW      OT Pain    Date/Time Pain Type Side/Orientation Location Rating: Rest Rating: Activity Interventions [facility]   02/11/22 1315 Pain Assessment right leg 2 6 breathing exercises LW          Prior Living Environment      Most Recent Value   Current Living Arrangements home   Living Environment Comment Pt lives alone in a house with 1+2+1 JOSE MARTIN. Has Full bath and bedroom on first floor with a tub. Pt has FF of stairs to her B&B with walk in shower.        Prior Level of Function      Most Recent Value   Ambulation independent   Transferring independent   Toileting independent   Bathing independent   Dressing independent   Eating independent   Communication understands/communicates without difficulty   Prior Level of Function Comment Pt reports being I w/ ADLs.   Assistive Device Currently Used at Home cane, straight, shower chair, commode, 3-in-1, other (see comments)  [has dressing kit]        Occupational Profile      Most Recent Value   Occupational History/Life Experiences Retired: managed women's health  practice. Pt enjoys cooking and going to the gym           OT Evaluation and Treatment - 02/11/22 1341        OT Time Calculation    Start Time 1315     Stop Time 1336     Time Calculation (min) 21 min        Session Details    Document Type initial evaluation     Mode of Treatment occupational therapy        General Information    Patient Profile Reviewed yes     General Observations of Patient Pt received in recliner chair at therapy gym. Pt anxious to go home 2/2 dizziness and low energy.     Existing Precautions/Restrictions weight bearing        Weight-bearing Status    Right LE Weight-Bearing Status weight-bearing as tolerated (WBAT)        Cognition/Psychosocial    Affect/Mental Status (Cognition) WFL;anxious     Orientation Status (Cognition) oriented x 4     Follows Commands (Cognition) WFL     Cognitive Function WFL     Comment, Cognition Pt anxious to go home 2/2 feeling light headed, dizzy, and low energy w/ transfers and functional mobility.        Hearing Assessment    Hearing Status WFL        Vision Assessment/Intervention    Visual Impairment/Limitations WFL        Sensory Assessment (Somatosensory)    Sensory Assessment (Somatosensory) UE sensation intact        Range of Motion (ROM)    Range of Motion ROM is WFL;bilateral upper extremities        Strength (Manual Muscle Testing)    Strength (Manual Muscle Testing) strength is WFL;bilateral upper extremities        Transfers    Transfers toilet transfer        Sit to Stand Transfer    [place]vance, Sit to Stand Transfer close supervision     Verbal Cues hand placement     Assistive Device walker, front-wheeled     Comment Close S for safety 2/2 dizziness and light headed. Vcs to push up from edge of steady surface instead of walker.        Stand to Sit Transfer    Advance, Stand to Sit Transfer close supervision     Verbal Cues hand placement     Assistive Device walker, front-wheeled     Comment Close S for safety 2/2 dizziness and light  headed. VCs to reach back for armrests prior to sitting.        Toilet Transfer    Transfer Technique sit-stand;stand-sit     [place], Toilet Transfer close supervision     Verbal Cues hand placement     Assistive Device walker, front-wheeled;commode, 3-in-1     Comment Pt close S for safety. Commode used to simulate raised toilet seat at home w/ R sink and L window sill pt uses for support.        Safety Issues, Functional Mobility    Impairments Affecting Function (Mobility) balance;endurance/activity tolerance;pain;range of motion (ROM)        Balance    Balance Assessment sitting static balance;sitting dynamic balance;sit to stand dynamic balance;standing static balance;standing dynamic balance     Static Sitting Balance WFL     Dynamic Sitting Balance WFL     Sit to Stand Dynamic Balance WFL     Static Standing Balance WFL;supported     Dynamic Standing Balance mild impairment;supported     Comment, Balance supported by RW        Lower Body Dressing    Tasks don;underwear     [place] supervision     Position edge of bed sitting;supported standing     Adaptive Equipment none     Comment S for safety 2/2 dizziness. Pt able to don LBD w/o AE and stand to adjust underwear supported by RW.        BADL Safety/Performance    Impairments, BADL Safety/Performance balance;endurance/activity tolerance;pain;range of motion     Skilled BADL Treatment/Intervention BADL process/adaptation training;energy conservation        ADL Interventions    Energy Conservation Techniques activity pacing encouraged;breathing techniques encouraged;equipment and device use facilitated;regular rest breaks encouraged     Self-Care (BADL) Promotion activity pacing encouraged;affected side dressed first;close supervision for balance provided;close supervision for safety provided;equipment or device utilized;regular rest breaks encouraged;rest breaks provided        AM-PAC (TM) - ADL (Current Function)    Putting on and taking off  regular lower body clothing? 4 - None     Bathing? 3 - A Little     Toileting? 4 - None     Putting on/taking off regular upper body clothing? 4 - None     How much help for taking care of personal grooming? 4 - None     Eating meals? 4 - None     AM-PAC (TM) ADL Score 23        Assessment/Plan (OT)    Daily Outcome Statement OT eval completed. Pt was close S for safety precautions w/ functional transfers using RW and S for LBD 2/2 pt feeling dizzy, light headed and low energy t/o session. Pt anxious to go home today 2/2 safety precautions. Pt will benefit from additional OT session to incr I w/ functional tasks and to evaluate full dressing and shower/tub transfer after pt dizziness and light headedness is managed.     Rehab Potential good, to achieve stated therapy goals     Therapy Frequency 3-5 times/wk     Planned Therapy Interventions BADL retraining;adaptive equipment training;occupation/activity based interventions;transfer/mobility retraining               OT Assessment/Plan      Most Recent Value   OT Recommended Discharge Disposition home with assistance at 02/11/2022 1341   Anticipated Equipment Needs At Discharge (OT) walker, front-wheeled at 02/11/2022 1341   Patient/Family Therapy Goal Statement To go home the next day. at 02/11/2022 1341                    Education Documentation  Treatment Plan, taught by Amanda Morin at 2/11/2022  2:23 PM.  Learner: Patient  Readiness: Acceptance  Method: Explanation  Response: Verbalizes Understanding, Demonstrated Understanding  Comment: Pt educated on safety, technique and energy conservation w/ transfers, functional mobility and ADLs.          OT Goals      Most Recent Value   Transfer Goal 1    Activity/Assistive Device sit-to-stand/stand-to-sit, toilet at 02/11/2022 1341   [place] modified independence at 02/11/2022 1341   Time Frame by discharge at 02/11/2022 1341   Progress/Outcome goal ongoing at 02/11/2022 1341   Transfer Goal 2     Activity/Assistive Device shower, tub at 02/11/2022 1341   [place] supervision required at 02/11/2022 1341   Time Frame by discharge at 02/11/2022 1341   Progress/Outcome goal ongoing at 02/11/2022 1341   Dressing Goal 1    Activity/Adaptive Equipment lower body dressing at 02/11/2022 1341   [place] modified independence at 02/11/2022 1341   Time Frame by discharge at 02/11/2022 1341   Progress/Outcome goal ongoing at 02/11/2022 1341   Toileting Goal 1    Activity/Assistive Device toileting skills, all at 02/11/2022 1341   [place] modified independence at 02/11/2022 1341   Time Frame by discharge at 02/11/2022 1341   Progress/Outcome goal ongoing at 02/11/2022 1341

## 2022-02-11 NOTE — PROGRESS NOTES
Home Care - Verified with nurse, Jonathan, patient not cleared for home. Will completed referral to [facility] in anticipation of discharge tomorrow. Alondra Luna RN, .

## 2022-02-11 NOTE — PROGRESS NOTES
SUBJECTIVE   Interval History: Patient seen after return from physical therapy gym.  Had episode of vomiting this morning after taking pain medications on empty stomach.  Pain is overall controlled.  No further vomiting episodes.  Passing gas.  No bowel movements yet denies any chest pain or shortness of breath.       OBJECTIVE      Vital signs in last 24 hours:  Temp:  [37.1 °C (98.7 °F)-37.4 °C (99.3 °F)] 37.1 °C (98.7 °F)  Heart Rate:  [79-99] 79  Resp:  [19-20] 19  BP: (109-128)/(48-64) 109/56      Intake/Output Summary (Last 24 hours) at 2/11/2022 1438  Last data filed at 2/10/2022 1717  Gross per 24 hour   Intake --   Output 600 ml   Net -600 ml         PHYSICAL EXAMINATION      General: Well appearing, NAD  HEENT: Moist membrane, PERRL, anicteric sclera   Neck: supple, no JVD  Cardiac: RRR, +S1/S2  Lungs: clear bilaterally, no wheezing/rales/rhonchi  Abdomen: soft, NT/ND, +BS, no rebound/guarding   Extremities: Right knee incision dressing in place.  No significant swelling  Neuro: AAOx3, nonfocal  Skin: warm, well perfused  Psych: cooperative, appropriate      LINES, CATHETERS, DRAINS, AIRWAYS, AND WOUNDS   Lines, Drains, Airways, Wounds:  Peripheral IV (Adult) 02/10/22 Posterior;Right Hand (Active)   Number of days: 1       Surgical Incision Knee Right (Active)   Number of days: 1       Comments:          LABS / IMAGING / TELE   Labs:  Sodium 139, potassium 3.8, creatinine 0.5, magnesium 2.1, WBC 7.6, hemoglobin 12.0    SARS-CoV-2 (COVID-19) (no units)   Date/Time Value   02/07/2022 1159 Negative        Radiology:  I have independently reviewed the pertinent imaging from the last 24 hrs.    ECG/Telemetry:  Not applicable      ASSESSMENT AND PLAN     * Osteoarthritis of right knee  Assessment & Plan  -Status post right knee total arthroplasty on 2/10 by Dr. Leos  -Defer DVT prophylaxis to orthopedic team with aspirin twice a day  -Continue pain control combination of Tylenol, oxycodone and  morphine  -Add MiraLAX to prevent immobility/opioid-induced constipation  -PT/OT evaluation and treatment    Hyperlipidemia  Assessment & Plan  -Continue pravastatin    Type 2 diabetes mellitus without complication, without long-term current use of insulin (CMS/[facility])  Assessment & Plan  - Chronic condition  - Resume Metformin post-operatively  - AccuChek + SSI + diabetic diet    Primary hypertension  Assessment & Plan  -Hold losartan hydrochlorothiazide for now due to low blood pressure reading  -Once blood pressure rebounds, resume antihypertensives        VTE Assessment: Padua VTE Score: 3   I have reassessed and the patient's VTE risk and treatment plan is appropriate.  VTE prophylaxis: Aspirin twice daily per orthopedic recommendation    CODE STATUS: Full Code    Expected Discharge Date:  2/12/2022      Disposition Planning: Continue to monitor hemodynamics before resuming blood pressur meds.  Awaiting clearance from PT and orthopedic surgery    PDMP: I have queried and reviewed the state Prescription Drug Monitoring Program [PDMP]    Discussed with Consultant: Kevin Taylor DO  02/11/22 2:36 PM    Speech recognition software was used to create this note. Please disregard any inadvertent translation errors. Please call the office for any clarifications.

## 2022-02-12 VITALS
HEART RATE: 70 BPM | BODY MASS INDEX: 20.26 KG/M2 | WEIGHT: 129.1 LBS | OXYGEN SATURATION: 97 % | TEMPERATURE: 99 F | DIASTOLIC BLOOD PRESSURE: 52 MMHG | RESPIRATION RATE: 18 BRPM | HEIGHT: 67 IN | SYSTOLIC BLOOD PRESSURE: 101 MMHG

## 2022-02-12 LAB
ANION GAP SERPL CALC-SCNC: 9 MEQ/L (ref 3–15)
BUN SERPL-MCNC: 20 MG/DL (ref 8–20)
CALCIUM SERPL-MCNC: 8.6 MG/DL (ref 8.9–10.3)
CHLORIDE SERPL-SCNC: 106 MEQ/L (ref 98–109)
CO2 SERPL-SCNC: 24 MEQ/L (ref 22–32)
CREAT SERPL-MCNC: 0.5 MG/DL (ref 0.6–1.1)
ERYTHROCYTE [DISTWIDTH] IN BLOOD BY AUTOMATED COUNT: 12.6 % (ref 11.7–14.4)
GFR SERPL CREATININE-BSD FRML MDRD: >60 ML/MIN/1.73M*2
GLUCOSE BLD-MCNC: 116 MG/DL (ref 70–99)
GLUCOSE BLD-MCNC: 138 MG/DL (ref 70–99)
GLUCOSE SERPL-MCNC: 134 MG/DL (ref 70–99)
HCT VFR BLDCO AUTO: 33.9 % (ref 35–45)
HGB BLD-MCNC: 11.3 G/DL (ref 11.8–15.7)
MCH RBC QN AUTO: 31.6 PG (ref 28–33.2)
MCHC RBC AUTO-ENTMCNC: 33.3 G/DL (ref 32.2–35.5)
MCV RBC AUTO: 94.7 FL (ref 83–98)
PDW BLD AUTO: 9.7 FL (ref 9.4–12.3)
PLATELET # BLD AUTO: 157 K/UL (ref 150–369)
POCT TEST: ABNORMAL
POCT TEST: ABNORMAL
POTASSIUM SERPL-SCNC: 4.5 MEQ/L (ref 3.6–5.1)
RBC # BLD AUTO: 3.58 M/UL (ref 3.93–5.22)
SODIUM SERPL-SCNC: 139 MEQ/L (ref 136–144)
WBC # BLD AUTO: 8.24 K/UL (ref 3.8–10.5)

## 2022-02-12 PROCEDURE — 63700000 HC SELF-ADMINISTRABLE DRUG: Performed by: NURSE PRACTITIONER

## 2022-02-12 PROCEDURE — 99225 PR SBSQ OBSERVATION CARE/DAY 25 MINUTES: CPT | Performed by: HOSPITALIST

## 2022-02-12 PROCEDURE — 80048 BASIC METABOLIC PNL TOTAL CA: CPT | Performed by: NURSE PRACTITIONER

## 2022-02-12 PROCEDURE — 63700000 HC SELF-ADMINISTRABLE DRUG: Performed by: PHYSICIAN ASSISTANT

## 2022-02-12 PROCEDURE — 63700000 HC SELF-ADMINISTRABLE DRUG: Performed by: HOSPITALIST

## 2022-02-12 PROCEDURE — 82310 ASSAY OF CALCIUM: CPT | Performed by: NURSE PRACTITIONER

## 2022-02-12 PROCEDURE — 97116 GAIT TRAINING THERAPY: CPT | Mod: GP,CQ

## 2022-02-12 PROCEDURE — 36415 COLL VENOUS BLD VENIPUNCTURE: CPT | Performed by: NURSE PRACTITIONER

## 2022-02-12 PROCEDURE — 85027 COMPLETE CBC AUTOMATED: CPT | Performed by: NURSE PRACTITIONER

## 2022-02-12 PROCEDURE — 97530 THERAPEUTIC ACTIVITIES: CPT | Mod: GO

## 2022-02-12 PROCEDURE — 97530 THERAPEUTIC ACTIVITIES: CPT | Mod: GP,CQ

## 2022-02-12 PROCEDURE — 97535 SELF CARE MNGMENT TRAINING: CPT | Mod: GO

## 2022-02-12 PROCEDURE — 63600000 HC DRUGS/DETAIL CODE: Performed by: NURSE PRACTITIONER

## 2022-02-12 RX ORDER — LOSARTAN POTASSIUM 100 MG/1
100 TABLET ORAL EVERY MORNING
Status: DISCONTINUED | OUTPATIENT
Start: 2022-02-12 | End: 2022-02-12 | Stop reason: HOSPADM

## 2022-02-12 RX ORDER — ASPIRIN 81 MG/1
81 TABLET ORAL 2 TIMES DAILY
Qty: 60 TABLET | Refills: 0 | Status: SHIPPED | OUTPATIENT
Start: 2022-02-12 | End: 2022-03-14

## 2022-02-12 RX ORDER — OXYCODONE HYDROCHLORIDE 5 MG/1
5-10 TABLET ORAL EVERY 4 HOURS PRN
Qty: 30 TABLET | Refills: 0 | Status: SHIPPED | OUTPATIENT
Start: 2022-02-12 | End: 2022-02-17

## 2022-02-12 RX ADMIN — GABAPENTIN 400 MG: 400 CAPSULE ORAL at 07:45

## 2022-02-12 RX ADMIN — TIZANIDINE 2 MG: 2 TABLET ORAL at 07:45

## 2022-02-12 RX ADMIN — TIZANIDINE 2 MG: 2 TABLET ORAL at 01:47

## 2022-02-12 RX ADMIN — OXYCODONE HYDROCHLORIDE 5 MG: 5 TABLET ORAL at 05:09

## 2022-02-12 RX ADMIN — ACETAMINOPHEN 650 MG: 325 TABLET ORAL at 07:45

## 2022-02-12 RX ADMIN — PANTOPRAZOLE SODIUM 40 MG: 40 TABLET, DELAYED RELEASE ORAL at 07:45

## 2022-02-12 RX ADMIN — ACETAMINOPHEN 650 MG: 325 TABLET ORAL at 01:36

## 2022-02-12 RX ADMIN — DOCUSATE SODIUM 100 MG: 100 CAPSULE, LIQUID FILLED ORAL at 07:45

## 2022-02-12 RX ADMIN — KETOROLAC TROMETHAMINE 15 MG: 15 INJECTION, SOLUTION INTRAMUSCULAR; INTRAVENOUS at 01:36

## 2022-02-12 RX ADMIN — LOSARTAN POTASSIUM 100 MG: 100 TABLET, FILM COATED ORAL at 10:35

## 2022-02-12 RX ADMIN — ASPIRIN 81 MG: 81 TABLET, COATED ORAL at 07:45

## 2022-02-12 ASSESSMENT — COGNITIVE AND FUNCTIONAL STATUS - GENERAL
AFFECT: WFL
HELP NEEDED FOR PERSONAL GROOMING: 4 - NONE
DRESSING REGULAR LOWER BODY CLOTHING: 4 - NONE
TOILETING: 4 - NONE
CLIMB 3 TO 5 STEPS WITH RAILING: 4 - NONE
AFFECT: WFL
WALKING IN HOSPITAL ROOM: 4 - NONE
DRESSING REGULAR UPPER BODY CLOTHING: 4 - NONE
MOVING TO AND FROM BED TO CHAIR: 4 - NONE
EATING MEALS: 4 - NONE
STANDING UP FROM CHAIR USING ARMS: 4 - NONE
HELP NEEDED FOR BATHING: 3 - A LITTLE

## 2022-02-12 NOTE — PROGRESS NOTES
Patient: Cecily Cuenca  Location:  [place] [address]  MRN:  [id number]  Today's date:  2/12/2022    Cecily is a 76 y.o. female admitted on 2/10/2022 with Osteoarthritis of right knee, unspecified osteoarthritis type [M17.11]  Osteoarthritis [M19.90]. Principal problem is Osteoarthritis of right knee.    Past Medical History  Cecily has a past medical history of Arthritis, COVID-19 vaccine series completed, Depression, Diverticulitis of colon, History of MRSA infection (2018), History of transfusion, Lipid disorder, Sciatica without back pain, left, and Skin cancer.    History of Present Illness   S/p  R TKA      PT Vitals    Date/Time Pulse HR Source SpO2 Pt Activity O2 Therapy BP BP Location BP Method Pt Position [facility]   02/11/22 1315 79 Monitor 95 % At rest None (Room air) 109/56 Right upper arm Automatic Sitting LW      PT Pain    Date/Time Pain Type Side/Orientation Location Rating: Rest Rating: Activity Interventions [facility]   02/11/22 1315 Pain Assessment right leg 2 6 breathing exercises LW          Prior Living Environment      Most Recent Value   Current Living Arrangements home   Living Environment Comment Pt lives alone in a house with 1+2+1 JOSE MARTIN. Has Full bath and bedroom on first floor with a tub. Pt has FF of stairs to her B&B with walk in shower.        Prior Level of Function      Most Recent Value   Ambulation independent   Transferring independent   Toileting independent   Bathing independent   Dressing independent   Eating independent   Communication understands/communicates without difficulty   Prior Level of Function Comment Pt reports being I w/ ADLs.   Assistive Device Currently Used at Home cane, straight, shower chair, commode, 3-in-1, other (see comments)  [has dressing kit]           PT Evaluation and Treatment - 02/11/22 1314        PT Time Calculation    Start Time 1314     Stop Time 1344     Time Calculation (min) 30 min        Session Details    Document Type daily  treatment/progress note     Mode of Treatment physical therapy        General Information    Patient Profile Reviewed yes     General Observations of Patient Pt. rec'd in dept., in recliner, agreeable to tx.     Existing Precautions/Restrictions weight bearing        Weight-bearing Status    Right LE Weight-Bearing Status weight-bearing as tolerated (WBAT)        Cognition/Psychosocial    Affect/Mental Status (Cognition) WFL;anxious        Range of Motion (ROM)    Range of Motion right lower extremity     Right Lower Extremity (ROM) knee     Knee, Right (ROM) 2-80 degrees, AROM        Transfers    Transfers car transfer     Maintains Weight-Bearing Status (Transfers) able to maintain        Sit to Stand Transfer    [place], Sit to Stand Transfer close supervision     Verbal Cues hand placement     Assistive Device walker, front-wheeled        Stand to Sit Transfer    [place], Stand to Sit Transfer close supervision     Verbal Cues technique;hand placement     Assistive Device walker, front-wheeled        Car Transfer    Transfer Technique sit-stand;stand-sit     [place], Car Transfer supervision;verbal cues;nonverbal cues (demo/gesture)     Verbal Cues technique;safety     Assistive Device walker, front-wheeled        Gait Training    [place], Gait supervision     Assistive Device walker, front-wheeled     Distance in Feet 55 feet     Pattern (Gait) step-through     Deviations/Abnormal Patterns (Gait) dhaval decreased     Maintains Weight-bearing Status (Gait) able to maintain     Comment (Gait/Stairs) Pt. amb. 55 ft. x 2, slow, mildly antalgic,yet, steady gait.        Balance    Static Sitting Balance WFL     Dynamic Sitting Balance WFL     Sit to Stand Dynamic Balance WFL     Static Standing Balance WFL;supported     Dynamic Standing Balance WFL;supported        Therapeutic Exercise    Therapeutic Exercise lower extremity        Lower Extremity (Therapeutic Exercise)    Exercise Position/Type  "seated     General Exercise bilateral;ankle pumps;quad sets;LAQ (long arc quad)     Range of Motion Exercises knee flexion/extension     Reps and Sets 1/10        AM-PAC (TM) - Mobility (Current Function)    Turning from your back to your side while in a flat bed without using bedrails? 3 - A Little     Moving from lying on your back to sitting on the side of a flat bed without using bedrails? 3 - A Little     Moving to and from a bed to a chair? 3 - A Little     Standing up from a chair using your arms? 3 - A Little     To walk in a hospital room? 3 - A Little     Climbing 3-5 steps with a railing? 3 - A Little     AM-PAC (TM) Mobility Score 18        Assessment/Plan (PT)    Daily Outcome Statement Pt . sup. to min assist with limited function. Pt. edy tx. fairly well, yet, c/o fatigue & \"light headed\" with mobility limiting ind. function @ this time. Cont. with POC in dept. next tx. day.               PT Assessment/Plan      Most Recent Value   PT Recommended Discharge Disposition home at 02/10/2022 1433   Anticipated Equipment Needs at Discharge (PT) walker, front-wheeled at 02/10/2022 1433   Patient/Family Therapy Goals Statement to go home at 02/10/2022 1433                  RN cleared patient for tx.    Patient returned to room, in recliner, with therapy aide.    PT Goals      Most Recent Value   Transfer Goal 1    Activity/Assistive Device sit-to-stand/stand-to-sit, bed-to-chair/chair-to-bed, car transfer, walker, front-wheeled at 02/10/2022 1433   [place] modified independence at 02/10/2022 1433   Time Frame by discharge at 02/10/2022 1433   Progress/Outcome goal ongoing at 02/11/2022 1314   Gait Training Goal 1    Activity/Assistive Device gait (walking locomotion), walker, front-wheeled at 02/10/2022 1433   [place] modified independence at 02/10/2022 1433   Distance 200 at 02/10/2022 1433   Time Frame by discharge at 02/10/2022 1433   Progress/Outcome goal ongoing at 02/11/2022 1314   Stairs Goal " 1    Activity/Assistive Device stairs, all skills, using handrail, left, using handrail, right at 02/10/2022 1433   [place] modified independence at 02/10/2022 1433   Number of Stairs 8 at 02/10/2022 1433   Time Frame by discharge at 02/10/2022 1433   Progress/Outcome goal ongoing at 02/11/2022 1314

## 2022-02-12 NOTE — PROGRESS NOTES
Patient: Cecily Cuenca  Location:  [place] [address]  MRN:  [id number]  Today's date:  2/12/2022    Cecily is a 76 y.o. female admitted on 2/10/2022 with Osteoarthritis of right knee, unspecified osteoarthritis type [M17.11]  Osteoarthritis [M19.90]. Principal problem is Osteoarthritis of right knee.    Past Medical History  Cecily has a past medical history of Arthritis, COVID-19 vaccine series completed, Depression, Diverticulitis of colon, History of MRSA infection (2018), History of transfusion, Lipid disorder, Sciatica without back pain, left, and Skin cancer.    History of Present Illness   S/p  R TKA      PT Vitals    Date/Time Pulse SpO2 Pt Activity O2 Therapy BP BP Location BP Method Pt Position [facility]   02/12/22 0840 70 97 % At rest None (Room air) 101/52 Left upper arm Automatic Sitting MD      PT Pain    Date/Time Pain Type Rating: Rest Rating: Activity [facility]   02/12/22 0840 Pain Assessment 0 - no pain 0 - no pain MD          Prior Living Environment      Most Recent Value   Current Living Arrangements home   Living Environment Comment Pt lives alone in a house with 1+2+1 JOSE MARTIN. Has Full bath and bedroom on first floor with a tub. Pt has FF of stairs to her B&B with walk in shower.        Prior Level of Function      Most Recent Value   Ambulation independent   Transferring independent   Toileting independent   Bathing independent   Dressing independent   Eating independent   Communication understands/communicates without difficulty   Prior Level of Function Comment Pt reports being I w/ ADLs.   Assistive Device Currently Used at Home cane, straight, shower chair, commode, 3-in-1, other (see comments)  [has dressing kit]           PT Evaluation and Treatment - 02/12/22 0840        PT Time Calculation    Start Time 0840     Stop Time 0920     Time Calculation (min) 40 min        Session Details    Document Type daily treatment/progress note     Mode of Treatment physical therapy        General  "Information    Patient Profile Reviewed yes     General Observations of Patient Pt. rec'd in recliner, in dept., agreeable to tx. Pt. states \"I feel so much better this morning.\"     Existing Precautions/Restrictions weight bearing        Weight-bearing Status    Right LE Weight-Bearing Status weight-bearing as tolerated (WBAT)        Cognition/Psychosocial    Affect/Mental Status (Cognition) WFL        Bed Mobility    Bed Mobility bed mobility (all) activities     [place], All Bed Mobility Activities independent     Assistive Device none   flat mat       Transfers    Transfers car transfer     Maintains Weight-Bearing Status (Transfers) able to maintain        Sit to Stand Transfer    [place], Sit to Stand Transfer modified independence     Verbal Cues hand placement     Assistive Device walker, front-wheeled        Stand to Sit Transfer    [place], Stand to Sit Transfer modified independence     Verbal Cues technique     Assistive Device walker, front-wheeled        Car Transfer    Transfer Technique sit-stand;stand-sit     [place], Car Transfer modified independence     Verbal Cues technique        Gait Training    [place], Gait modified independence     Assistive Device walker, front-wheeled     Distance in Feet 200 feet     Pattern (Gait) step-through     Deviations/Abnormal Patterns (Gait) gait speed decreased     Maintains Weight-bearing Status (Gait) able to maintain     Advanced Gait Activity 10 Meter Walk Test (Self-Selected Velocity)     Comment (Gait/Stairs) Pt. amb. 200 ft. x 1 & 100 ft. x 1,slow/steady gait. Pt. requires RW to safely ambulate.        10 Meter Walk Test (Self-Selected Velocity)    Trial One: Ten Meter Walk Test (sec) 16.59 seconds     Trial Two: Ten Meter Walk Test (sec) 15.5 seconds     Trial Three: Ten Meter Walk Test (sec) 0 seconds     Assistive Device walker, front-wheeled     Trial One: Gait Speed (m/s) 0.36 m/s     Trial Two: Gait Speed (m/s) 0.39 m/s     " Average Gait Speed (m/s): Two Trials 0.38 m/s        Stairs Training    [place], Stairs modified independence     Assistive Device railing     Handrail Location (Stairs) right side (ascending);left side (descending)     Number of Stairs 12     Stair Height 6 inches     Ascending Stairs Technique step-to-step     Descending Stairs Technique step-to-step     Maintains Weight-bearing Status (Stairs) able to maintain        Balance    Static Sitting Balance WFL     Dynamic Sitting Balance WFL     Sit to Stand Dynamic Balance WFL     Static Standing Balance WFL     Dynamic Standing Balance WFL;supported        Therapeutic Exercise    Therapeutic Exercise lower extremity        Lower Extremity (Therapeutic Exercise)    Exercise Position/Type seated     General Exercise bilateral;ankle pumps;LAQ (long arc quad);quad sets     Reps and Sets 1/10        AM-PAC (TM) - Mobility (Current Function)    Turning from your back to your side while in a flat bed without using bedrails? 4 - None     Moving from lying on your back to sitting on the side of a flat bed without using bedrails? 4 - None     Moving to and from a bed to a chair? 4 - None     Standing up from a chair using your arms? 4 - None     To walk in a hospital room? 4 - None     Climbing 3-5 steps with a railing? 4 - None     AM-PAC (TM) Mobility Score 24        Assessment/Plan (PT)    Daily Outcome Statement Pt. currently mod ind. with overall function. Pt. has achieved PT's goals & appears safe for d/c home.               PT Assessment/Plan      Most Recent Value   PT Recommended Discharge Disposition home at 02/10/2022 1433   Anticipated Equipment Needs at Discharge (PT) walker, front-wheeled  [Pt. instructed with & issued RW as AD.] at 02/12/2022 0840   Patient/Family Therapy Goals Statement to go home at 02/10/2022 1433               Equipment Provided: Walker, with Wheels, Adult   Vendor: [employer]  RN cleared patient for tx.     Patient returned to room, in recliner, with therapy aide.  PT Goals      Most Recent Value   Transfer Goal 1    Activity/Assistive Device sit-to-stand/stand-to-sit, bed-to-chair/chair-to-bed, car transfer, walker, front-wheeled at 02/10/2022 1433   [place] modified independence at 02/10/2022 1433   Time Frame by discharge at 02/10/2022 1433   Progress/Outcome goal met at 02/12/2022 0840   Gait Training Goal 1    Activity/Assistive Device gait (walking locomotion), walker, front-wheeled at 02/10/2022 1433   [place] modified independence at 02/10/2022 1433   Distance 200 at 02/10/2022 1433   Time Frame by discharge at 02/10/2022 1433   Progress/Outcome goal met at 02/12/2022 0840   Stairs Goal 1    Activity/Assistive Device stairs, all skills, using handrail, left, using handrail, right at 02/10/2022 1433   [place] modified independence at 02/10/2022 1433   Number of Stairs 8 at 02/10/2022 1433   Time Frame by discharge at 02/10/2022 1433   Progress/Outcome goal met at 02/12/2022 0840

## 2022-02-12 NOTE — PROGRESS NOTES
SUBJECTIVE   Interval History: Patient seen in chair, awaiting discharge.  Denies any complaints.  Passing flatus but no bowel movements.  Pain controlled.       OBJECTIVE      Vital signs in last 24 hours:  Temp:  [36.7 °C (98.1 °F)-37.2 °C (99 °F)] 37.2 °C (99 °F)  Heart Rate:  [70-79] 70  Resp:  [16-18] 18  BP: (101-127)/(45-75) 101/52    No intake or output data in the 24 hours ending 02/12/22 1138      PHYSICAL EXAMINATION      General: Well appearing, NAD  HEENT: Moist membrane, PERRL, anicteric sclera   Neck: supple, no JVD  Cardiac: RRR, +S1/S2  Lungs: clear bilaterally, no wheezing/rales/rhonchi  Abdomen: soft, NT/ND, +BS, no rebound/guarding   Extremities: no edema, clubbing, cyanosis   Neuro: AAOx3, nonfocal  Skin: warm, well perfused  Psych: cooperative, appropriate      LINES, CATHETERS, DRAINS, AIRWAYS, AND WOUNDS   Lines, Drains, Airways, Wounds:  Peripheral IV (Adult) 02/10/22 Posterior;Right Hand (Active)   Number of days: 2       Surgical Incision Knee Right (Active)   Number of days: 2       Comments:          LABS / IMAGING / TELE   Labs:  I have reviewed the patient's labs to the time of note. No new clinical concern.    SARS-CoV-2 (COVID-19) (no units)   Date/Time Value   02/07/2022 1159 Negative        Radiology:  I have independently reviewed the pertinent imaging from the last 24 hrs.    ECG/Telemetry:  Not applicable      ASSESSMENT AND PLAN     * Osteoarthritis of right knee  Assessment & Plan  -Status post right knee total arthroplasty on 2/10 by Dr. Leos  -Defer DVT prophylaxis to orthopedic team with aspirin twice a day  -Continue pain control combination of Tylenol, oxycodone and morphine  -Add MiraLAX to prevent immobility/opioid-induced constipation  -PT/OT evaluation and treatment    Hyperlipidemia  Assessment & Plan  -Continue pravastatin    Type 2 diabetes mellitus without complication, without long-term current use of insulin (CMS/[facility])  Assessment & Plan  - Chronic  condition  - Resume Metformin post-operatively  - AccuChek + SSI + diabetic diet    Primary hypertension  Assessment & Plan  -Hold losartan hydrochlorothiazide for now due to low blood pressure reading  -Once blood pressure rebounds, resume antihypertensives  -Advised that she checks blood pressure at home prior to resuming both losartan and hydrochlorothiazide          VTE Assessment: [place] VTE Score: 3   I have reassessed and the patient's VTE risk and treatment plan is appropriate.  VTE prophylaxis: Aspirin twice daily, per orthopedic recommendation    CODE STATUS: Full Code    Expected Discharge Date:  2/11/2022        Disposition Planning: Medically stable for discharge home.  Monitor blood pressure closely    PDMP: I have queried and reviewed the state Prescription Drug Monitoring Program [PDMP]    Discussed with Consultant: Nursing    Discussed with Family: Family, present at the bedside      Gregorio Taylor DO  02/12/22 11:38 AM    Speech recognition software was used to create this note. Please disregard any inadvertent translation errors. Please call the office for any clarifications.

## 2022-02-12 NOTE — UM PHYSICIAN REVIEW NOTE
Utilization Secondary Review Note      Patient Name: Cecily Cuenca      MRN: [id number]  Insurance: MEDICARE  Admission date: 2/10/2022  Initial order:  Outpatient   Planned admission: Yes  Post Discharge Review: No            Inpatient services are appropriate for this 76 y.o.  female admitted for R TKA. PT/OT consulted for continued therapy. Patient has required > 2 MN.       Alexandrea Lange DO  2/12/2022

## 2022-02-12 NOTE — PROGRESS NOTES
Orthopaedic Surgery    Procedures Performed During Hospitalization  Procedure(s):  right total knee arthtoplasty  Operative Date  2/10/2022  -------------------  Post-op Day: 2 Days Post-Op    Subjective   Interval History  Patient seen and examined  Resting comfortably  Pain well controlled  Tolerating PO intake. +Void  Denies N/V/F/C/CP/SOB    Doing well s/p Procedure(s):  right total knee arthtoplasty    Objective    Temp:  [36.7 °C (98.1 °F)-37.2 °C (99 °F)] 37.2 °C (99 °F)  Heart Rate:  [70-80] 70  Resp:  [16-18] 18  BP: (108-128)/(45-75) 127/65    General: NAD, Alert  Pulmonary: Non-labored breathing  Cardiovascular: Regular rate    Ortho Exam:    Right Lower Extremity:  Inspection - Compartments soft. No gross ecchymosis, edema, or deformity.   Dressing: Clean, dry, intact  Sensory - SILT in the Saphenous / Sural / Superficial Peroneal / Deep Peroneal / Plantar nerve distributions  Motor - +EHL/FHL/TA/Gs  Palpable DP pulse  Extremity is warm    Labs    Results from last 7 days   Lab Units 02/12/22  0516 02/11/22  0543   WBC K/uL 8.24 7.62   HEMOGLOBIN g/dL 11.3* 12.0   HEMATOCRIT % 33.9* 36.2   PLATELETS K/uL 157 150       Results from last 7 days   Lab Units 02/12/22  0516 02/11/22  0543   SODIUM mEQ/L 139 139   POTASSIUM mEQ/L 4.5 3.8   CHLORIDE mEQ/L 106 106   CO2 mEQ/L 24 24   BUN mg/dL 20 18   CREATININE mg/dL 0.5* 0.5*   GLUCOSE mg/dL 134* 140*   CALCIUM mg/dL 8.6* 8.5*             Assessment   Cecily Cuenca is a 76 y.o. female 2 Days Post-Op s/p R TKA (2/12/2022) - Dr. Leos    Plan    -pain control  -DVT ppx: SCDS, early ambulation, ASA 81mg BID x 4 weeks   -PT/OT OOB WBAT  -Monitor I&Os   -Neurovascular checks   -medical management per [facility]  -Possible DC today pending PT/OT

## 2022-02-12 NOTE — PROGRESS NOTES
Surgery Daily Progress Note  Keshia Valdovinos MD        2022 7:42 AM     Patient: Che Juarez   MRN: [id number]   : 1941     SUBJECTIVE  No acute events overnight.     PHYSICAL EXAM  Vitals:  Blood pressure 134/74, pulse (!) 59, temperature 98 °F (36.7 °C), temperature source Oral, resp. rate 18, height 5' 5\" (1.651 m), weight 69.7 kg (153 lb 10.6 oz), SpO2 99 %.    General: no acute distress  CV: regular rate  Resp: non labored breathing  GI: abdomen soft, nontender, distended with ascites  Ext: warm and well perfused    INTAKE/OUTPUT  I/O last 3 completed shifts:  In: -   Out: 200 [Urine:200]  No intake/output data recorded.     LABS  WBC (K/mcL)   Date Value   2022 6.3     RBC (mil/mcL)   Date Value   2022 2.27 (L)     HCT (%)   Date Value   2022 23.7 (L)     HGB (g/dL)   Date Value   2022 7.2 (L)     PLT (K/mcL)   Date Value   2022 195       Sodium (mmol/L)   Date Value   2022 137     Potassium (mmol/L)   Date Value   2022 3.7     Chloride (mmol/L)   Date Value   2022 105     Glucose (mg/dL)   Date Value   2022 98     Calcium (mg/dL)   Date Value   2022 8.3 (L)     Carbon Dioxide (mmol/L)   Date Value   2022 24     BUN (mg/dL)   Date Value   2022 125 (H)     Creatinine (mg/dL)   Date Value   2022 3.29 (H)       GOT/AST (Units/L)   Date Value   2022 33     GPT/ALT (Units/L)   Date Value   2022 17     No results found for: GGTP  Alkaline Phosphatase (Units/L)   Date Value   2022 84     Bilirubin, Total (mg/dL)   Date Value   2022 0.8       IMAGING  No results found.     Assessment / Plan  Che Juarez is a 80 year old male who presents with renal failure and need for dialysis. General surgery consulted for peritoneal catheter placement. The patient has chronic abdominal distention and ascites with last paracentesis 6 months ago. He also has chronic anemia and GI is following with plans for  Patient:  Cecily Cuenca  Location:  [place] [address]  MRN:  [id number]  Today's date:  2/12/2022    Patient brought back to room by aide.    Cecily is a 76 y.o. female admitted on 2/10/2022 with Osteoarthritis of right knee, unspecified osteoarthritis type [M17.11]  Osteoarthritis [M19.90]. Principal problem is Osteoarthritis of right knee.    Past Medical History  Cecily has a past medical history of Arthritis, COVID-19 vaccine series completed, Depression, Diverticulitis of colon, History of MRSA infection (2018), History of transfusion, Lipid disorder, Sciatica without back pain, left, and Skin cancer.    History of Present Illness   S/p  R TKA    Vitals:    02/12/22 0840   BP: (!) 101/52   Pulse: 70   Resp:    Temp:    SpO2: 97%           Prior Living Environment      Most Recent Value   Current Living Arrangements home   Living Environment Comment Pt lives alone in a house with 1+2+1 JOSE MARTIN. Has Full bath and bedroom on first floor with a tub. Pt has FF of stairs to her B&B with walk in shower.        Prior Level of Function      Most Recent Value   Ambulation independent   Transferring independent   Toileting independent   Bathing independent   Dressing independent   Eating independent   Communication understands/communicates without difficulty   Prior Level of Function Comment Pt reports being I w/ ADLs.   Assistive Device Currently Used at Home cane, straight, shower chair, commode, 3-in-1, other (see comments)  [has dressing kit]        Occupational Profile      Most Recent Value   Occupational History/Life Experiences Retired: managed women's health practice. Pt enjoys cooking and going to the gym           OT Evaluation and Treatment - 02/12/22 0851        OT Time Calculation    Start Time 0851     Stop Time 0928     Time Calculation (min) 37 min        Session Details    Document Type daily treatment/progress note     Mode of Treatment occupational therapy        General Information    Patient  Profile Reviewed yes     Referring Physician Dr. Leos     General Observations of Patient patient seen in gym     Existing Precautions/Restrictions weight bearing        Weight-bearing Status    Right LE Weight-Bearing Status weight-bearing as tolerated (WBAT)        Cognition/Psychosocial    Affect/Mental Status (Cognition) WFL        Transfers    Transfers shower transfer        Sit to Stand Transfer    [place], Sit to Stand Transfer modified independence     Assistive Device walker, front-wheeled        Stand to Sit Transfer    [place], Stand to Sit Transfer modified independence     Assistive Device walker, front-wheeled        Shower Transfer    [place], Shower Transfer supervision     Comment back in        Balance    Static Sitting Balance WFL     Dynamic Sitting Balance WFL     Static Standing Balance WFL     Dynamic Standing Balance mild impairment        Upper Body Dressing    [place] independent        Lower Body Dressing    Tasks socks;underwear;shoes/slippers;pants/bottoms     [place] supervision        AM-PAC (TM) - ADL (Current Function)    Putting on and taking off regular lower body clothing? 4 - None     Bathing? 3 - A Little     Toileting? 4 - None     Putting on/taking off regular upper body clothing? 4 - None     How much help for taking care of personal grooming? 4 - None     Eating meals? 4 - None     AM-PAC (TM) ADL Score 23        Assessment/Plan (OT)    Daily Outcome Statement Patient safe for d/c home with assist when medically stable.               OT Assessment/Plan      Most Recent Value   OT Recommended Discharge Disposition home with assistance at 02/12/2022 0851   Anticipated Equipment Needs At Discharge (OT) walker, front-wheeled at 02/11/2022 1341   Patient/Family Therapy Goal Statement to go home at 02/12/2022 0851                         OT Goals      Most Recent Value   Transfer Goal 1    Activity/Assistive Device sit-to-stand/stand-to-sit, toilet at  capsule endoscopy.    CT abdomen/pelvis with large volume of ascites    - no plan for surgical intervention  - patient is not a candidate for peritoneal dialysis catheter placement in setting of large amount of ascites, which would lead to postoperative complications including continued leaking around the catheter and increased risk of infection  - recommend alternative method of dialysis for this patient  - rest per primary team     Plan was discussed with attending, Dr. Marcelino Valdovinos, PGY-4  General Surgery       02/11/2022 1341   [place] modified independence at 02/11/2022 1341   Time Frame by discharge at 02/11/2022 1341   Progress/Outcome goal met at 02/12/2022 0851   Transfer Goal 2    Activity/Assistive Device shower, tub at 02/11/2022 1341   [place] supervision required at 02/11/2022 1341   Time Frame by discharge at 02/11/2022 1341   Progress/Outcome goal met at 02/12/2022 0851   Dressing Goal 1    Activity/Adaptive Equipment lower body dressing at 02/11/2022 1341   [place] modified independence at 02/11/2022 1341   Time Frame by discharge at 02/11/2022 1341   Progress/Outcome goal partially met at 02/12/2022 0851   Toileting Goal 1    Activity/Assistive Device toileting skills, all at 02/11/2022 1341   [place] modified independence at 02/11/2022 1341   Time Frame by discharge at 02/11/2022 1341   Progress/Outcome goal met at 02/12/2022 0851

## (undated) DEVICE — SUTURE QUILL PDO 2 RX-1066Q

## (undated) DEVICE — COVER MAYO STAND

## (undated) DEVICE — GLOVE PROTEXIS PI ORTHO 8.5

## (undated) DEVICE — LABEL MEDICATION NEUO ORTHO

## (undated) DEVICE — ADHESIVE SKIN DERMABOND ADVANCED 0.7ML

## (undated) DEVICE — WRAP COBAN LATEX FREE 6IN STERILE

## (undated) DEVICE — ***USE 140734***PACK TOTAL KNEE

## (undated) DEVICE — ***USE 132713***BANDAGE ESMARK 6X9 LATEX FREE

## (undated) DEVICE — MANIFOLD SINGLE PORT NEPTUNE

## (undated) DEVICE — BLADE SCALPEL #10

## (undated) DEVICE — DRESSING AQUACEL ADVA ANTIMCROBIAL 3.5 X 10IN

## (undated) DEVICE — SOLN IRRIG .9%SOD 500ML

## (undated) DEVICE — SUTURE MONCRYL PLUS UNDYED SH  STRATAFIX SPIRAL 3-0 20CM

## (undated) DEVICE — TIP SUCTION YANKAUER

## (undated) DEVICE — APPLICATOR CHLORAPREP 26ML ORANGE TINT

## (undated) DEVICE — BLADE SAGITTAL

## (undated) DEVICE — GLOVE 8 PROTEXIS PI MICRO

## (undated) DEVICE — ***USE 138712*** SUTURE VICRYL 1 J261H CT-1 UND 27IN

## (undated) DEVICE — SKIN MARKER SURGICAL

## (undated) DEVICE — DRAPE HALF STERILE

## (undated) DEVICE — PACK OR TOWEL

## (undated) DEVICE — SUTURE MONOCRYL 2-0 Y266H

## (undated) DEVICE — SPONGE LAP 18X18 SAFE-T RFID ENHANCED XRAY

## (undated) DEVICE — STOCKINETTE IMPRVS XLG STRL 16X48

## (undated) DEVICE — COVER LIGHTHANDLE

## (undated) DEVICE — PENEVAC1 NONSTICK SMOKE EVAC